# Patient Record
Sex: FEMALE | Race: WHITE | NOT HISPANIC OR LATINO | Employment: OTHER | URBAN - METROPOLITAN AREA
[De-identification: names, ages, dates, MRNs, and addresses within clinical notes are randomized per-mention and may not be internally consistent; named-entity substitution may affect disease eponyms.]

---

## 2023-10-24 DIAGNOSIS — E78.2 DM TYPE 2 WITH DIABETIC MIXED HYPERLIPIDEMIA (MULTI): ICD-10-CM

## 2023-10-24 DIAGNOSIS — E11.69 DM TYPE 2 WITH DIABETIC MIXED HYPERLIPIDEMIA (MULTI): Primary | ICD-10-CM

## 2023-10-24 DIAGNOSIS — E11.69 DM TYPE 2 WITH DIABETIC MIXED HYPERLIPIDEMIA (MULTI): ICD-10-CM

## 2023-10-24 DIAGNOSIS — E78.2 DM TYPE 2 WITH DIABETIC MIXED HYPERLIPIDEMIA (MULTI): Primary | ICD-10-CM

## 2023-10-24 PROBLEM — G62.9 PERIPHERAL NEUROPATHY: Status: ACTIVE | Noted: 2023-10-24

## 2023-10-24 PROBLEM — Z98.890 S/P LUMPECTOMY, LEFT BREAST: Status: ACTIVE | Noted: 2023-10-24

## 2023-10-24 PROBLEM — N20.0 NEPHROLITHIASIS: Status: ACTIVE | Noted: 2023-10-24

## 2023-10-24 PROBLEM — M65.342 TRIGGER RING FINGER OF LEFT HAND: Status: ACTIVE | Noted: 2023-10-24

## 2023-10-24 PROBLEM — Z97.8 FOLEY CATHETER STATUS: Status: ACTIVE | Noted: 2023-10-24

## 2023-10-24 PROBLEM — E83.52 HYPERCALCEMIA: Status: ACTIVE | Noted: 2023-10-24

## 2023-10-24 PROBLEM — E66.3 OVERWEIGHT: Status: ACTIVE | Noted: 2023-10-24

## 2023-10-24 PROBLEM — M79.18 MUSCULOSKELETAL PAIN: Status: ACTIVE | Noted: 2023-10-24

## 2023-10-24 PROBLEM — N13.30 HYDRONEPHROSIS: Status: ACTIVE | Noted: 2023-10-24

## 2023-10-24 PROBLEM — C50.912 INVASIVE DUCTAL CARCINOMA OF BREAST, LEFT (MULTI): Status: ACTIVE | Noted: 2023-10-24

## 2023-10-24 PROBLEM — E78.5 HLD (HYPERLIPIDEMIA): Status: ACTIVE | Noted: 2022-04-01

## 2023-10-24 PROBLEM — N20.1 LEFT URETERAL STONE: Status: ACTIVE | Noted: 2023-10-24

## 2023-10-24 PROBLEM — Z85.3 HISTORY OF BREAST CANCER: Status: ACTIVE | Noted: 2022-04-01

## 2023-10-24 PROBLEM — M65.331 TRIGGER MIDDLE FINGER OF RIGHT HAND: Status: ACTIVE | Noted: 2023-10-24

## 2023-10-24 RX ORDER — ATORVASTATIN CALCIUM 10 MG/1
10 TABLET, FILM COATED ORAL DAILY
Qty: 30 TABLET | Refills: 0 | Status: SHIPPED | OUTPATIENT
Start: 2023-10-24 | End: 2024-01-03

## 2023-10-24 RX ORDER — BLOOD SUGAR DIAGNOSTIC
STRIP MISCELLANEOUS
COMMUNITY
Start: 2020-07-09

## 2023-10-24 RX ORDER — CHOLECALCIFEROL (VITAMIN D3) 125 MCG
1 CAPSULE ORAL DAILY
COMMUNITY

## 2023-10-24 RX ORDER — ATORVASTATIN CALCIUM 10 MG/1
10 TABLET, FILM COATED ORAL DAILY
COMMUNITY
Start: 2023-09-19 | End: 2023-10-24 | Stop reason: SDUPTHER

## 2023-10-24 RX ORDER — METFORMIN HYDROCHLORIDE 1000 MG/1
1000 TABLET ORAL 2 TIMES DAILY
Qty: 60 TABLET | Refills: 0 | Status: SHIPPED | OUTPATIENT
Start: 2023-10-24 | End: 2024-01-03

## 2023-10-24 RX ORDER — HYDROCODONE BITARTRATE AND ACETAMINOPHEN 5; 325 MG/1; MG/1
1 TABLET ORAL EVERY 4 HOURS PRN
COMMUNITY

## 2023-10-24 RX ORDER — REPAGLINIDE 1 MG/1
TABLET ORAL
Qty: 60 TABLET | Refills: 0 | Status: SHIPPED | OUTPATIENT
Start: 2023-10-24 | End: 2024-01-03

## 2023-10-24 RX ORDER — GABAPENTIN 600 MG/1
600 TABLET ORAL 2 TIMES DAILY
COMMUNITY
Start: 2023-09-27 | End: 2024-01-22 | Stop reason: ALTCHOICE

## 2023-10-24 RX ORDER — AMITRIPTYLINE HYDROCHLORIDE 10 MG/1
1-2 TABLET, FILM COATED ORAL NIGHTLY PRN
COMMUNITY
Start: 2023-08-30 | End: 2023-11-30 | Stop reason: WASHOUT

## 2023-10-24 RX ORDER — BIOTIN 1 MG
1 TABLET ORAL DAILY
COMMUNITY

## 2023-10-24 RX ORDER — REPAGLINIDE 1 MG/1
1 TABLET ORAL
COMMUNITY
Start: 2023-10-17 | End: 2023-10-24 | Stop reason: SDUPTHER

## 2023-10-24 RX ORDER — LETROZOLE 2.5 MG/1
2.5 TABLET, FILM COATED ORAL DAILY
COMMUNITY
Start: 2022-11-01 | End: 2023-11-30 | Stop reason: WASHOUT

## 2023-10-24 RX ORDER — METFORMIN HYDROCHLORIDE 1000 MG/1
1000 TABLET ORAL 2 TIMES DAILY
COMMUNITY
Start: 2023-09-16 | End: 2023-10-24 | Stop reason: SDUPTHER

## 2023-10-24 RX ORDER — LANOLIN ALCOHOL/MO/W.PET/CERES
1 CREAM (GRAM) TOPICAL 2 TIMES DAILY
COMMUNITY

## 2023-10-25 ENCOUNTER — HOSPITAL ENCOUNTER (OUTPATIENT)
Dept: RADIOLOGY | Facility: HOSPITAL | Age: 73
Discharge: HOME | End: 2023-10-25
Payer: MEDICARE

## 2023-10-25 ENCOUNTER — OFFICE VISIT (OUTPATIENT)
Dept: PULMONOLOGY | Facility: HOSPITAL | Age: 73
End: 2023-10-25
Payer: MEDICARE

## 2023-10-25 VITALS
BODY MASS INDEX: 29.03 KG/M2 | SYSTOLIC BLOOD PRESSURE: 174 MMHG | OXYGEN SATURATION: 100 % | WEIGHT: 158.73 LBS | RESPIRATION RATE: 20 BRPM | TEMPERATURE: 97.3 F | HEART RATE: 63 BPM | DIASTOLIC BLOOD PRESSURE: 64 MMHG

## 2023-10-25 DIAGNOSIS — M31.30 WEGENER'S GRANULOMATOSIS WITHOUT RENAL INVOLVEMENT (MULTI): ICD-10-CM

## 2023-10-25 DIAGNOSIS — R91.8 LUNG NODULES: ICD-10-CM

## 2023-10-25 DIAGNOSIS — R59.0 MEDIASTINAL LYMPHADENOPATHY: Primary | ICD-10-CM

## 2023-10-25 PROCEDURE — 3077F SYST BP >= 140 MM HG: CPT | Performed by: INTERNAL MEDICINE

## 2023-10-25 PROCEDURE — 1126F AMNT PAIN NOTED NONE PRSNT: CPT | Performed by: INTERNAL MEDICINE

## 2023-10-25 PROCEDURE — 1036F TOBACCO NON-USER: CPT | Performed by: INTERNAL MEDICINE

## 2023-10-25 PROCEDURE — 99213 OFFICE O/P EST LOW 20 MIN: CPT | Performed by: INTERNAL MEDICINE

## 2023-10-25 PROCEDURE — 71250 CT THORAX DX C-: CPT

## 2023-10-25 PROCEDURE — 1160F RVW MEDS BY RX/DR IN RCRD: CPT | Performed by: INTERNAL MEDICINE

## 2023-10-25 PROCEDURE — 3078F DIAST BP <80 MM HG: CPT | Performed by: INTERNAL MEDICINE

## 2023-10-25 PROCEDURE — 1159F MED LIST DOCD IN RCRD: CPT | Performed by: INTERNAL MEDICINE

## 2023-10-25 PROCEDURE — 71250 CT THORAX DX C-: CPT | Performed by: RADIOLOGY

## 2023-10-25 RX ORDER — REPAGLINIDE 1 MG/1
TABLET ORAL
Qty: 180 TABLET | Refills: 2 | OUTPATIENT
Start: 2023-10-25

## 2023-10-25 ASSESSMENT — PAIN SCALES - GENERAL: PAINLEVEL: 0-NO PAIN

## 2023-10-25 NOTE — PROGRESS NOTES
Department of Medicine  Division of Pulmonary, Critical Care, and Sleep Medicine  Follow-Up Visit    ID:  Ms. Miley Botello is a 72 y.o. female former smoker with h/o breast cancer who presents today for follow-up of lung nodules.    HPI (10/25/2023): Ms. Ibarra is overall doing well and without any respiratory complaints.  She denies any recent fevers or chills.  She denies any shortness of breath, dyspnea on exertion, cough, wheezing, hemoptysis.  She denies any chest pain, lower extreme edema, orthopnea.  She denies any nausea, vomiting, odynophagia, dysphagia, heartburn, anorexia, or weight loss.  She denies any sinus congestion or postnasal drip.  She recently had the flu vaccine in the RSV vaccine.    Social History:    Tobacco: Former smoker - smoked 1/2 - 1 ppd from age [age]    Immunization History:  Immunization History   Administered Date(s) Administered    Flu vaccine, quadrivalent, high-dose, preservative free, age 65y+ (FLUZONE) 09/14/2020, 09/28/2021, 09/16/2022, 10/09/2023    Influenza, High Dose Seasonal, Preservative Free 11/29/2017, 10/22/2018    Pfizer COVID-19 vaccine, bivalent, age 12 years and older (30 mcg/0.3 mL) 09/19/2022    Pfizer Gray Cap SARS-CoV-2 05/15/2022    Pfizer Purple Cap SARS-CoV-2 02/12/2021, 03/05/2021, 10/11/2021    Pneumococcal conjugate vaccine, 13-valent (PREVNAR 13) 05/10/2017    Pneumococcal polysaccharide vaccine, 23-valent, age 2 years and older (PNEUMOVAX 23) 10/22/2018    RSV, 60 Years And Older (AREXVY) 10/11/2023    Tdap vaccine, age 7 year and older (BOOSTRIX) 04/08/2014       Current Medications:  Current Outpatient Medications   Medication Instructions    amitriptyline (Elavil) 10 mg tablet 1-2 tablets, oral, Nightly PRN    atorvastatin (LIPITOR) 10 mg, oral, Daily    biotin 1 mg tablet 1 tablet, oral, Daily    cholecalciferol (Vitamin D-3) 125 MCG (5000 UT) capsule 1 capsule, oral, Daily    cyanocobalamin (Vitamin B-12) 1,000 mcg tablet 1 tablet,  oral, 2 times daily    gabapentin (NEURONTIN) 600 mg, oral, 2 times daily    HYDROcodone-acetaminophen (Norco) 5-325 mg tablet 1 tablet, oral, Every 4 hours PRN    letrozole (FEMARA) 2.5 mg, oral, Daily    metFORMIN (GLUCOPHAGE) 1,000 mg, oral, 2 times daily    OneTouch Ultra Test strip CHECK BLOOD GLUCOSE ONCE A DAY    repaglinide (Prandin) 1 mg tablet TAKE 1 TABLET 2 TIMES DAILY, 15 MINUTES BEFORE MEALS (PLEASE MAKE APPOINTMENT)    VITAMIN B COMPLEX-FOLIC ACID ORAL 1 tablet, oral, Daily, ;vitamin B complex-folic acid 0.4 mg Tablet        Drug Allergies/Intolerances:  Allergies   Allergen Reactions    Bacitracin Other    Ciprofloxacin Myalgia    Miconazole Nitrate Other    Fluticasone Propionate Unknown    Nickel Rash    Sulfa (Sulfonamide Antibiotics) Rash        Physical Examination:  /64 (BP Location: Right arm, Patient Position: Sitting, BP Cuff Size: Adult)   Pulse 63   Temp 36.3 °C (97.3 °F) (Temporal)   Resp 20   Wt 72 kg (158 lb 11.7 oz)   SpO2 100%   BMI 29.03 kg/m²      CONSTITUTIONAL: alert and in no acute distress, well developed, well nourished, speaking in full sentences  HEAD & FACE: normal appearance. Palpation of the face and sinuses: normal  EARS, NOSE, MOUTH, & THROAT: External inspection of ears and nose: normal. Nasal mucosa, septum, and turbinates: normal. Lips, teeth, and gums: normal. Oropharynx: normal  NECK: no neck mass was observed, supple  PULMONARY: Chest: normal to inspection. Respiratory effort: no increased work of breathing or signs of respiratory distress. Percussion of chest: normal. Palpation of chest: normal. Auscultation of lungs: clear to auscultation bilaterally, no wheeze / rales / rhonchi  CARDIOVASCULAR: Palpation of heart: normal. Heart rate and rhythm were normal, normal S1 and S2, no gallops, no murmurs and no pericardial rub, no peripheral edema  ABDOMEN: Soft nontender; no abdominal mass palpated, normal bowel sounds, no organomegaly  LYMPHATIC: No  lymphadenopathy  MUSCULOSKELETAL: Gait and station: Normal, no clubbing or cyanosis of the fingernails. Muscle strength/tone: normal  NEUROLOGIC: cranial nerves II - XII were grossly intact. Sensation: normal. Motor Strength: normal. Coordination: normal  PSYCHIATRIC: Judgment and insight: intact, alert and oriented to person, place and time. Recent and remote memory: normal. Mood and affect: normal    Pulmonary Function Test Results           Chest Radiograph     XR CHEST 1 VIEW 03/29/2022    Narrative  MRN: [id number]  Patient Name: GURMEET ESCOBAR    STUDY:  CHEST 1 VIEW;  3/29/2022 9:42 am    INDICATION:  abd pain .    COMPARISON:  08/30/2017    ACCESSION NUMBER(S):  [id number]    ORDERING CLINICIAN:  MARIVEL GOMEZ    FINDINGS:  AP radiograph of the chest was provided.      CARDIOMEDIASTINAL SILHOUETTE:  Cardiomediastinal silhouette is normal in size and configuration.    LUNGS:  No focal consolidation, sizeable pleural effusion, or pneumothorax is  evident.    ABDOMEN:  No remarkable upper abdominal findings.    BONES:  No acute osseous changes.    Impression  1.  No evidence of acute cardiopulmonary process.      Echocardiogram     No results found for this or any previous visit from the past 365 days.       Chest CT Scan     CT chest 10/25/2023 -unchanged small bilateral peripheral lung nodules, unchanged mediastinal and hilar lymphadenopathy       Laboratory Studies    Metabolic Parameters     Sodium   Date/Time Value Ref Range Status   10/13/2022 11:05  136 - 145 mmol/L Final     Potassium   Date/Time Value Ref Range Status   10/13/2022 11:05 AM 4.9 3.5 - 5.3 mmol/L Final     Chloride   Date/Time Value Ref Range Status   10/13/2022 11:05  98 - 107 mmol/L Final     Bicarbonate   Date/Time Value Ref Range Status   10/13/2022 11:05 AM 26 21 - 32 mmol/L Final     Anion Gap   Date/Time Value Ref Range Status   10/13/2022 11:05 AM 16 10 - 20 mmol/L Final     Urea Nitrogen   Date/Time Value Ref Range Status    10/13/2022 11:05 AM 28 (H) 6 - 23 mg/dL Final     Creatinine   Date/Time Value Ref Range Status   10/13/2022 11:05 AM 0.76 0.50 - 1.05 mg/dL Final     GFR Female   Date/Time Value Ref Range Status   10/13/2022 11:05 AM 84 >90 mL/min/1.73m2 Final     Comment:      CALCULATIONS OF ESTIMATED GFR ARE PERFORMED   USING THE 2021 CKD-EPI STUDY REFIT EQUATION   WITHOUT THE RACE VARIABLE FOR THE IDMS-TRACEABLE   CREATININE METHODS.    https://jasn.asnjournals.org/content/early/2021/09/22/ASN.[email or web address]       Glucose   Date/Time Value Ref Range Status   10/13/2022 11:05  (H) 74 - 99 mg/dL Final     Calcium   Date/Time Value Ref Range Status   10/13/2022 11:05 AM 11.0 (H) 8.6 - 10.6 mg/dL Final     Phosphorus   Date/Time Value Ref Range Status   03/31/2022 12:03 PM 2.8 2.5 - 4.9 mg/dL Final     Comment:      The performance characteristics of phosphorus testing in   heparinized plasma have been validated by the individual     laboratory site where testing is performed. Testing    on heparinized plasma is not approved by the FDA;    however, such approval is not necessary.         Hematologic Parameters     WBC   Date/Time Value Ref Range Status   10/13/2022 11:05 AM 10.4 4.4 - 11.3 x10E9/L Final     Neutrophils Absolute   Date/Time Value Ref Range Status   10/13/2022 11:05 AM 7.14 1.20 - 7.70 x10E9/L Final     Neutrophils %   Date/Time Value Ref Range Status   10/13/2022 11:05 AM 69.0 40.0 - 80.0 % Final     Lymphocytes %   Date/Time Value Ref Range Status   10/13/2022 11:05 AM 22.7 13.0 - 44.0 % Final     Monocytes %   Date/Time Value Ref Range Status   10/13/2022 11:05 AM 5.3 2.0 - 10.0 % Final     Basophils %   Date/Time Value Ref Range Status   10/13/2022 11:05 AM 0.4 0.0 - 2.0 % Final     Eosinophils Absolute   Date/Time Value Ref Range Status   10/13/2022 11:05 AM 0.23 0.00 - 0.70 x10E9/L Final     Eosinophils %   Date/Time Value Ref Range Status   10/13/2022 11:05 AM 2.2 0.0 - 6.0 % Final     Hemoglobin  "  Date/Time Value Ref Range Status   10/13/2022 11:05 AM 12.0 12.0 - 16.0 g/dL Final     Hematocrit   Date/Time Value Ref Range Status   10/13/2022 11:05 AM 38.4 36.0 - 46.0 % Final     RBC   Date/Time Value Ref Range Status   10/13/2022 11:05 AM 4.21 4.00 - 5.20 x10E12/L Final     MCV   Date/Time Value Ref Range Status   10/13/2022 11:05 AM 91 80 - 100 fL Final     MCHC   Date/Time Value Ref Range Status   10/13/2022 11:05 AM 31.3 (L) 32.0 - 36.0 g/dL Final     Platelets   Date/Time Value Ref Range Status   10/13/2022 11:05  150 - 450 x10E9/L Final       Immunology Laboratory Tests     No results found for: \"ICIGE\", \"IGE\", \"ICA04\", \"ASPFU\", \"IGG\", \"IGA\", \"IGM\"    Assessment and Plan / Recommendations:  Problem List Items Addressed This Visit          Pulmonary and Pneumonias    Lung nodules    Current Assessment & Plan     Stable small bilateral lung nodules, likely sequelae of piror inflammatory process  Repeat CT chest in 1 year         Relevant Orders    CT chest wo IV contrast    Follow Up In Pulmonology       Symptoms and Signs    Mediastinal lymphadenopathy - Primary    Overview     Diffuse mediastinal and hilar FDG avid lymphadenopathy, biopsied October 2022 showing granulomatous inflammation in the station 7 LN         Current Assessment & Plan     Unchanged on repeat CT chest  Repeat CT chest in 1 year         Relevant Orders    CT chest wo IV contrast    Follow Up In Pulmonology            Follow-up: 1 year       Hieu Walker MD   10/25/2023   "

## 2023-10-25 NOTE — PATIENT INSTRUCTIONS
Lung nodules  Stable small bilateral lung nodules, likely sequelae of piror inflammatory process  Repeat CT chest in 1 year    Mediastinal lymphadenopathy  Unchanged on repeat CT chest  Repeat CT chest in 1 year

## 2023-10-25 NOTE — ASSESSMENT & PLAN NOTE
Stable small bilateral lung nodules, likely sequelae of piror inflammatory process  Repeat CT chest in 1 year

## 2023-11-24 NOTE — PROGRESS NOTES
"FUV for diabetes. LV with Dr. Singh 06/2023.    Subjective   Miley Botello is a 72 y.o. female with a hx of type 2 diabetes, HLD, breast cancer, who presents for management of diabetes.    Dx: 2017  HbA1c: 6.7% (11/30/2023), 6.8% (03/2022), 6.5% (09/2021)  Current regimen: metformin 1g BID and repaglinide 1 mg BID  Past medications: none  Complications: peripheral neuropathy    SMBG: does not check    Hypoglycemia: no  Hypoglycemia awareness: no    Diet: twice a day, low-carb  Eats breakfast and dinner    Exercise: goal [id number] steps/day    Today:  -ozempic was too expensive  -wants [place] but it is also cost prohibitive    ROS  General: no fever or chills  CV: no chest pain   Respiratory: no shortness of breath  MSK: no lower extremity edema  Neuro: no headache or dizziness  See HPI for Endocrine ROS    Objective    Physical Exam  Height 1.575 m (5' 2\"), weight 70.9 kg (156 lb 4.8 oz).  General: not in acute distress  HEENT: KENYATTA, EOMI  Thyroid: no goiter  Neuro: alert and oriented x 3      Current Outpatient Medications:     amitriptyline (Elavil) 10 mg tablet, Take 1-2 tablets (10-20 mg) by mouth as needed at bedtime., Disp: , Rfl:     atorvastatin (Lipitor) 10 mg tablet, TAKE 1 TABLET BY MOUTH EVERY DAY, Disp: 30 tablet, Rfl: 0    biotin 1 mg tablet, Take 1 tablet (1 mg) by mouth once daily., Disp: , Rfl:     cholecalciferol (Vitamin D-3) 125 MCG (5000 UT) capsule, Take 1 capsule (125 mcg) by mouth once daily., Disp: , Rfl:     cyanocobalamin (Vitamin B-12) 1,000 mcg tablet, Take 1 tablet (1,000 mcg) by mouth twice a day., Disp: , Rfl:     gabapentin (Neurontin) 600 mg tablet, Take 1 tablet (600 mg) by mouth 2 times a day., Disp: , Rfl:     metFORMIN (Glucophage) 1,000 mg tablet, TAKE 1 TABLET BY MOUTH TWICE A DAY, Disp: 60 tablet, Rfl: 0    OneTouch Ultra Test strip, CHECK BLOOD GLUCOSE ONCE A DAY, Disp: , Rfl:     repaglinide (Prandin) 1 mg tablet, TAKE 1 TABLET 2 TIMES DAILY, 15 MINUTES BEFORE MEALS " (PLEASE MAKE APPOINTMENT), Disp: 60 tablet, Rfl: 0    HYDROcodone-acetaminophen (Norco) 5-325 mg tablet, Take 1 tablet by mouth every 4 hours if needed for severe pain (7 - 10)., Disp: , Rfl:     VITAMIN B COMPLEX-FOLIC ACID ORAL, Take 1 tablet by mouth once daily. ;vitamin B complex-folic acid 0.4 mg Tablet, Disp: , Rfl:     Assessment/Plan   Miley Botello is a 72 y.o. female with a hx of type 2 diabetes, HLD, breast cancer, who presents for management of diabetes.    Type 2 diabetes mellitus  HbA1c: 6.7% (11/30/2023), 6.8% (03/2022), 6.5% (09/2021)  Current regimen: metformin 1g BID, repaglinide 1 mg BIDAC  Eye exam: +mild NPDR (10/2023)  Urine microalbumin: 50.8 micrograms/mg (2020)  Podiatry:  +neuropathy ( 11/2023)  Lipids: HDL 43, LDL 68,  (09/2021) on atorvastatin 10 mg QDAY    A1c: 6.7% today.  A1c has been stable.    Was told to start Ozempic if cleared by ophthalmologist at .  Recent ophthalmology note from 10/2023 states she has a posterior vitreous detachment.  The patient reports being told that everything was stable.  She did not start Ozempic because it was too expensive.  She is still interested in this medication. If her ophthalmologist is ok with starting Ozempic, I will have her apply for [employer] PAP.  Hx of pancreatitis: no  Personal or FH of medullary thyroid cancer: no  If she does not qualify for the PAP, we can try Trulicity or Mounjaro which may be covered by insurance.    Discussed low-carb diet and exercise with patient. She appears to be adherent to a low-carb diet.  She agrees that she needs to start incorporating resistance exercises to help with weight loss.    Peripheral neuropathy is terrible at night. She has difficulty sleeping.  Did not tolerate higher doses of gabapentin.  Recommended trial of duloxetine which she is agreeable to.    PLAN:  -if cleared by ophthalmology, start Ozempic 0.25 mg/week, increase to 0.5 mg/week after 4 weeks  -apply for [facility] PAP  (forms given-she will bring back to the office)  -continue metformin 1 g BID  -discontinue repaglinide when starting Ozempic  -continue gabapentin 600 mg at bedtime for neuropathy  -start duloxetine 30 mg QDAY  -check lipids, urine microalbumin, CMP    2. Hypercalcemia  Intermittently elevated calcium at least since 03/2022.  Highest calcium 11.1.    Labs from 05/2022  Ca 10.2  PTH 34.8  D25OH 49  Creatinine 0.75  PTHrP: normal    DXA: NA    Hx of kidney stones: once, approx 2020    Did not have time to discuss hypercalcemia in detail today.  PTH has never been checked when calcium is elevated.  Will need to determine PTH dependence first.  Will check labs below but will address again at next visit.    PLAN:  -check D25OH, PTH with labs above  -continue vitamin D supplements    Follow-up in 3-4 months

## 2023-11-30 ENCOUNTER — OFFICE VISIT (OUTPATIENT)
Dept: ENDOCRINOLOGY | Facility: CLINIC | Age: 73
End: 2023-11-30
Payer: MEDICARE

## 2023-11-30 VITALS — WEIGHT: 156.3 LBS | HEIGHT: 62 IN | BODY MASS INDEX: 28.76 KG/M2

## 2023-11-30 DIAGNOSIS — E11.69 DM TYPE 2 WITH DIABETIC MIXED HYPERLIPIDEMIA (MULTI): ICD-10-CM

## 2023-11-30 DIAGNOSIS — G62.89 OTHER POLYNEUROPATHY: Primary | ICD-10-CM

## 2023-11-30 DIAGNOSIS — E78.2 DM TYPE 2 WITH DIABETIC MIXED HYPERLIPIDEMIA (MULTI): ICD-10-CM

## 2023-11-30 DIAGNOSIS — E83.52 HYPERCALCEMIA: ICD-10-CM

## 2023-11-30 LAB — POC HEMOGLOBIN A1C: 6.7 % (ref 4.2–6.5)

## 2023-11-30 PROCEDURE — 99215 OFFICE O/P EST HI 40 MIN: CPT | Performed by: STUDENT IN AN ORGANIZED HEALTH CARE EDUCATION/TRAINING PROGRAM

## 2023-11-30 PROCEDURE — G2212 PROLONG OUTPT/OFFICE VIS: HCPCS | Performed by: STUDENT IN AN ORGANIZED HEALTH CARE EDUCATION/TRAINING PROGRAM

## 2023-11-30 PROCEDURE — 1036F TOBACCO NON-USER: CPT | Performed by: STUDENT IN AN ORGANIZED HEALTH CARE EDUCATION/TRAINING PROGRAM

## 2023-11-30 PROCEDURE — 83036 HEMOGLOBIN GLYCOSYLATED A1C: CPT | Performed by: STUDENT IN AN ORGANIZED HEALTH CARE EDUCATION/TRAINING PROGRAM

## 2023-11-30 PROCEDURE — 1160F RVW MEDS BY RX/DR IN RCRD: CPT | Performed by: STUDENT IN AN ORGANIZED HEALTH CARE EDUCATION/TRAINING PROGRAM

## 2023-11-30 PROCEDURE — 1126F AMNT PAIN NOTED NONE PRSNT: CPT | Performed by: STUDENT IN AN ORGANIZED HEALTH CARE EDUCATION/TRAINING PROGRAM

## 2023-11-30 PROCEDURE — 1159F MED LIST DOCD IN RCRD: CPT | Performed by: STUDENT IN AN ORGANIZED HEALTH CARE EDUCATION/TRAINING PROGRAM

## 2023-11-30 RX ORDER — DULOXETIN HYDROCHLORIDE 30 MG/1
30 CAPSULE, DELAYED RELEASE ORAL DAILY
Qty: 30 CAPSULE | Refills: 11 | Status: SHIPPED | OUTPATIENT
Start: 2023-11-30 | End: 2024-11-29

## 2023-12-07 ENCOUNTER — TELEPHONE (OUTPATIENT)
Dept: HEMATOLOGY/ONCOLOGY | Facility: HOSPITAL | Age: 73
End: 2023-12-07

## 2023-12-15 ENCOUNTER — OFFICE VISIT (OUTPATIENT)
Dept: HEMATOLOGY/ONCOLOGY | Facility: CLINIC | Age: 73
End: 2023-12-15
Payer: MEDICARE

## 2023-12-15 DIAGNOSIS — Z85.3 HISTORY OF LEFT BREAST CANCER: Primary | ICD-10-CM

## 2023-12-15 PROCEDURE — 99213 OFFICE O/P EST LOW 20 MIN: CPT | Performed by: NURSE PRACTITIONER

## 2023-12-15 PROCEDURE — 1126F AMNT PAIN NOTED NONE PRSNT: CPT | Performed by: NURSE PRACTITIONER

## 2023-12-15 PROCEDURE — 1036F TOBACCO NON-USER: CPT | Performed by: NURSE PRACTITIONER

## 2023-12-15 PROCEDURE — 1160F RVW MEDS BY RX/DR IN RCRD: CPT | Performed by: NURSE PRACTITIONER

## 2023-12-15 PROCEDURE — 1159F MED LIST DOCD IN RCRD: CPT | Performed by: NURSE PRACTITIONER

## 2023-12-15 NOTE — PROGRESS NOTES
Oncology Follow-Up    Miley Botello  [id number]            Breast         AJCC Edition: 7th (AJCC), Diagnosis Date: Aug 2017, IA, T1c pN0 M0   Oncology History    No history exists.     Treatment Synopsis:    Current treatment- observation        Treatment History:    Pt states that she had routine mammogram at time of cancer diagnosis but did not have any palpable findings.  Initial w/u including mammogram, biopsy done at [facility] (reports obtained).  Prior left breast core biopsy in 9/1/06- fat necrosis (no cancer).       6/2/17- Screening b/l mammogram- indeterminate left breast asymmetry noted and additional imaging recc.      6/15/17- Left diagnostic mammogram/U/S- Mammo showed a focal asymmetry in left breast 12:00.  1 X 0.9 X 0.4 cm lobulated hypoechoic left breast mass at 12:00, 2 cm FN.  No abnormalities seen in left axilla.  Breast mass suspicious and biopsy recc     7/6/17- U/s guided biopsy of left breast mass 12:00, 2 cm FN- IDC, grade 3, DCIS, grade 3.  Some features of invasive micropapillary cancer present.  Receptors- ER strongly positive >95%, WV moderately positive 80%, HER 2+ by IHC (equivocal), HER2/CEP17  ratio 1.9 (equivocal)     8/15/17- Left lumpectomy and SN biopsy- IDC, 1.4 cm, grade 3, indeterminate LVI, 0/2 LNs involved.  Margins (deep new margin)- cauterized atypical intraductal proliferation suspicious for DCIS, all other margins negative.  Path staging: pT1cN0.  Receptors-  ER strongly positive >95%, WV strongly positive 85%, HER 1+ by IHC (negative)     Oncotype DX RS Score 16 (low risk category; 10% 10 yr distant recurrence risk with endocrine therapy)     9/27/17 - 10/24/17-  left breast radiation completed      11/2017- adjuvant endocrine therapy with letrozole.     1/5/22  - Breast Cancer Index testing showed a 9.5 risk of recurrent distant disease and a 5.2% risk of recurrent distant disease. There is no benefit with extended therapy. Plan to discontinue letrozole in  November 2022.      Subjective    Miley presents for her Routine follow up visit. She is feeling well and reports no new health issues. She is planning on spending 2 months in [place] this winter as she did last winter and really enjoyed her time there. Miley denies any unusual headaches, balance issues, depression, cough, shortness of breath, problems swallowing, changes in chest/breast area, abdominal pain, bone or muscle pain, vaginal bleeding, rectal bleeding, blood in the urine, vaginal dryness, swelling arms or legs, new or unusual skin moles or lesions.         Objective      There were no vitals filed for this visit.     Constitutional: Well developed, alert/oriented x3, no distress, cooperative   Eyes: clear sclera   ENMT: mucous membranes moist, no apparent lesions   Head/Neck: Neck supple, no bruits   Respiratory/Thorax: Patent airways, normal breath sounds with good chest expansion   Cardiovascular: Regular rate and rhythm, no murmurs, 2+ equal pulses of the extremities,   Gastrointestinal: Nondistended, soft, non-tender, no masses palpable, no organomegaly   Musculoskeletal: ROM intact, no joint swelling, normal strength   Extremities: normal extremities, no edema, cyanosis, contusions or wounds   Neurological: alert and oriented x3,  normal strength   Breast:     Lymphatic: No significant lymphadenopathy   Psychological: Appropriate mood and behavior   Skin: Warm and dry, no lesions, no rashes      Physical Exam  Chest:          Comments: Left breast + for breast conserving surgery with well healed cental superior and left axillary incisions; no masses, nodules, skin changes, discharge. Right breast without masses, nodules, skin changes, discharge          Lab Results   Component Value Date    WBC 10.4 10/13/2022    HGB 12.0 10/13/2022    HCT 38.4 10/13/2022    MCV 91 10/13/2022     10/13/2022       Chemistry    Lab Results   Component Value Date/Time     10/13/2022 1105    K 4.9 10/13/2022  1105     10/13/2022 1105    CO2 26 10/13/2022 1105    BUN 28 (H) 10/13/2022 1105    CREATININE 0.76 10/13/2022 1105    Lab Results   Component Value Date/Time    CALCIUM 11.0 (H) 10/13/2022 1105    ALKPHOS 51 05/06/2022 1330    AST 10 05/06/2022 1330    ALT 13 05/06/2022 1330    BILITOT 0.4 05/06/2022 1330              Imaging:  Narrative & Impression   Interpreted By:  NITISH WELCH MD  MRN: [id number]  Patient Name: GURMEET ESCOBAR     STUDY:  DIGITAL MAMM SCREENING W/ KISHOR;  6/21/2023 9:55 am     ACCESSION NUMBER(S):  [id number]     ORDERING CLINICIAN:  JULIANA MIXON     INDICATION:  Screening. History of a left lumpectomy with radiation therapy.  History of a bilateral breast reduction.     COMPARISON:  06/20/2022, 06/18/2021, 06/15/2020, 06/02/2017     FINDINGS:  2D and tomosynthesis images were reviewed at 1 mm slice thickness.     The breast tissue is almost entirely fatty.  In the lateral superior  left breast at middle depth there are dystrophic calcifications,  surgical clips, and scarring at the lumpectomy site. The lumpectomy  site is stable. Bilateral postreduction changes are noted. No  suspicious masses or calcifications are identified.     IMPRESSION:  No mammographic evidence of malignancy.     BI-RADS CATEGORY:     Category: 2 - Benign.  Recommendation: 1 Year Screening.     Assessment/Plan    Gurmeet is a 72 yo woman with a hx of T1CN0 left breast cancer diagnose din August 2017. She is s/p partial mastectomy, XRT, and completed 5 years of letrozole in November 2022. There is no evidence of recurrent disease on today's exam.   Plan:  Exam is negative.  Discussed weight loss strategies.  Encouraged monthly breast self exams, plant based diet, keep alcohol <3 drinks/week, exercise at least 2.5 hours/week.  We reviewed signs/symptoms of recurrence including new masses, new pigmented lesion, tugging or pulling of the skin, nipple discharge, rash in or around the chest area, or any new finding that  doesn't resolve within a 2-3 weeks.  All of Miley's questions/concerns were addressed.  Over 20 minutes of time was spent with this patient with >50% of the time with education, counseling, and coordination of care.   Hope will see Ya Henriquez NP in June with her mammogram. I will see her back in one year. She will call with any concerns.  Diagnoses and all orders for this visit:  History of left breast cancer          Linette Vo, MELO-CNP

## 2023-12-15 NOTE — PATIENT INSTRUCTIONS
1. Exercise 2.5 hours per week; bone strengthening, cardio-vascular, resistance training.  2. Please do self breast exams monthly.  3. Keep alcohol under 3 drinks per week.  4. Sun safety - limit sun exposure from 11a-2p when its at its hottest, apply 15-30 sun block and re-apply every 1-2 hours if perspiring or swimming.  5. Eat a plant based diet, add in oily fishes such as mackerel, tuna, and salmon.  6. Get in at least 1,000 mg of calcium per day through diet or supplement for bone strength. Examples of foods higher in calcium are milk, yogurt, fruited yogurt, oranges, fortified orange juice, almonds, almond milk, broccoli, spinach, bok jeri, mustard greens, puddings, custards, ice cream, fortified cereals, bars, and crackers.   7. Your exam is negative!  8. Please call the office if any new mass or rash in or around breast, or any uncontrolled symptoms that last over 2-3 weeks at [phone].  9. See Ya Henriquez NP in June with your mammogram.   10. It was nice seeing you today, Hope. I will see you back in one year. Have a wonderful time in [place]!  Thank you for choosing [facility] for your care.  Have a Happy, Healthy, Holiday Season!

## 2024-01-02 DIAGNOSIS — E78.2 DM TYPE 2 WITH DIABETIC MIXED HYPERLIPIDEMIA (MULTI): ICD-10-CM

## 2024-01-02 DIAGNOSIS — E11.69 DM TYPE 2 WITH DIABETIC MIXED HYPERLIPIDEMIA (MULTI): ICD-10-CM

## 2024-01-03 RX ORDER — METFORMIN HYDROCHLORIDE 1000 MG/1
1000 TABLET ORAL 2 TIMES DAILY
Qty: 60 TABLET | Refills: 0 | Status: SHIPPED | OUTPATIENT
Start: 2024-01-03 | End: 2024-01-08 | Stop reason: SDUPTHER

## 2024-01-03 RX ORDER — ATORVASTATIN CALCIUM 10 MG/1
10 TABLET, FILM COATED ORAL DAILY
Qty: 30 TABLET | Refills: 0 | Status: SHIPPED | OUTPATIENT
Start: 2024-01-03 | End: 2024-01-08 | Stop reason: SDUPTHER

## 2024-01-03 RX ORDER — REPAGLINIDE 1 MG/1
TABLET ORAL
Qty: 60 TABLET | Refills: 3 | Status: SHIPPED | OUTPATIENT
Start: 2024-01-03 | End: 2024-01-08 | Stop reason: SDUPTHER

## 2024-01-08 DIAGNOSIS — E11.69 DM TYPE 2 WITH DIABETIC MIXED HYPERLIPIDEMIA (MULTI): ICD-10-CM

## 2024-01-08 DIAGNOSIS — E78.2 DM TYPE 2 WITH DIABETIC MIXED HYPERLIPIDEMIA (MULTI): ICD-10-CM

## 2024-01-08 RX ORDER — METFORMIN HYDROCHLORIDE 1000 MG/1
1000 TABLET ORAL 2 TIMES DAILY
Qty: 180 TABLET | Refills: 1 | Status: SHIPPED | OUTPATIENT
Start: 2024-01-08

## 2024-01-08 RX ORDER — REPAGLINIDE 1 MG/1
TABLET ORAL
Qty: 180 TABLET | Refills: 1 | Status: SHIPPED | OUTPATIENT
Start: 2024-01-08

## 2024-01-08 RX ORDER — ATORVASTATIN CALCIUM 10 MG/1
10 TABLET, FILM COATED ORAL DAILY
Qty: 90 TABLET | Refills: 0 | Status: SHIPPED | OUTPATIENT
Start: 2024-01-08 | End: 2024-04-11

## 2024-01-16 ENCOUNTER — TELEPHONE (OUTPATIENT)
Dept: ENDOCRINOLOGY | Facility: CLINIC | Age: 74
End: 2024-01-16
Payer: MEDICARE

## 2024-01-16 NOTE — TELEPHONE ENCOUNTER
Pa request forwarded from previous endo office. Attempted to complete. PA was closed stating the following:

## 2024-01-19 ENCOUNTER — APPOINTMENT (OUTPATIENT)
Dept: ENDOCRINOLOGY | Facility: CLINIC | Age: 74
End: 2024-01-19
Payer: MEDICARE

## 2024-01-22 DIAGNOSIS — G63 POLYNEUROPATHY ASSOCIATED WITH UNDERLYING DISEASE (MULTI): Primary | ICD-10-CM

## 2024-01-22 RX ORDER — GABAPENTIN 300 MG/1
CAPSULE ORAL
Qty: 180 CAPSULE | Refills: 1 | Status: SHIPPED | OUTPATIENT
Start: 2024-01-22

## 2024-04-04 NOTE — PROGRESS NOTES
"FUV for diabetes. LV with Dr. Singh 06/2023.    Subjective   Miley Botello is a 73 y.o. female with a hx of type 2 diabetes, HLD, breast cancer, who presents for management of diabetes.    Dx: 2017  HbA1c: 6.8% (4/11/2024), 6.7% (11/30/2023), 6.8% (03/2022), 6.5% (09/2021)  Current regimen: metformin 1g BID and repaglinide 1 mg BID  Past medications: none  Complications: peripheral neuropathy    SMBG: does not check    Hypoglycemia: no  Hypoglycemia awareness: no    Diet: twice a day, low-carb  Eats breakfast and dinner    Exercise: 10,000 steps/day    Today:  -spent 2 months in [place]; had a wonderful time    ROS  General: no fever or chills  CV: no chest pain   Respiratory: no shortness of breath  MSK: no lower extremity edema  Neuro: no headache or dizziness  See HPI for Endocrine ROS    Objective    Physical Exam  Blood pressure 134/74, temperature 36.7 °C (98 °F), temperature source Tympanic, resp. rate 20, height 1.575 m (5' 2\"), weight 72 kg (158 lb 11.2 oz).  General: not in acute distress  HEENT: ADRIAN YOU  Thyroid: no goiter  Neuro: alert and oriented x 3      Current Outpatient Medications:     atorvastatin (Lipitor) 10 mg tablet, TAKE 1 TABLET BY MOUTH EVERY DAY, Disp: 90 tablet, Rfl: 3    biotin 1 mg tablet, Take 1 tablet (1 mg) by mouth once daily., Disp: , Rfl:     cholecalciferol (Vitamin D-3) 125 MCG (5000 UT) capsule, Take 1 capsule (125 mcg) by mouth once daily., Disp: , Rfl:     cyanocobalamin (Vitamin B-12) 1,000 mcg tablet, Take 1 tablet (1,000 mcg) by mouth twice a day., Disp: , Rfl:     gabapentin (Neurontin) 300 mg capsule, Take 1 capsule at bedtime x 2 weeks. Then take 2 capsules at bedtime., Disp: 180 capsule, Rfl: 1    HYDROcodone-acetaminophen (Norco) 5-325 mg tablet, Take 1 tablet by mouth every 4 hours if needed for severe pain (7 - 10)., Disp: , Rfl:     metFORMIN (Glucophage) 1,000 mg tablet, Take 1 tablet (1,000 mg) by mouth 2 times a day., Disp: 180 tablet, Rfl: 1    OneTouch " Ultra Test strip, CHECK BLOOD GLUCOSE ONCE A DAY, Disp: , Rfl:     repaglinide (Prandin) 1 mg tablet, TAKE 1 TABLET 2 TIMES DAILY, 15 MINUTES BEFORE MEALS, Disp: 180 tablet, Rfl: 1    VITAMIN B COMPLEX-FOLIC ACID ORAL, Take 1 tablet by mouth once daily. ;vitamin B complex-folic acid 0.4 mg Tablet, Disp: , Rfl:     DULoxetine (Cymbalta) 30 mg DR capsule, Take 1 capsule (30 mg) by mouth once daily. Do not crush or chew., Disp: 30 capsule, Rfl: 11    Assessment/Plan   Miley Botello is a 73 y.o. female with a hx of type 2 diabetes, HLD, breast cancer, who presents for management of diabetes.    Type 2 diabetes mellitus  HbA1c: 6.8% (4/11/2024), 6.7% (11/30/2023), 6.8% (03/2022), 6.5% (09/2021)  Current regimen: metformin 1g BID, repaglinide 1 mg BIDAC  Eye exam: +mild NPDR (10/2023)  Urine microalbumin: 50.8 micrograms/mg (2020)  Podiatry:  +neuropathy (LV 11/2023)  Lipids: HDL 43, LDL 68,  (09/2021) on atorvastatin 10 mg QDAY    A1c: 6.8% today.    Has not been approved for Ozempic yet.  Re-faxed application with requested information on 1/4, but never received approval or further communication from [employer].  Will apply again. Patient completed form after our visit today. Faxed in application along with all of of her insurance cards.    Tried duloxetine for neuropathy but did not tolerate (had tremors and shakiness); resumed gabapentin.  She would like to not have to take gabapentin long-term if possible.  Discussed trial of alpha lipoic acid and Qutenza.  She would like to try Qutenza if affordable.    PLAN:  -Ozempic 0.25 mg/week, increase to 0.5 mg/week after 4 weeks  -continue metformin 1 g BID  -discontinue repaglinide when starting Ozempic  -continue gabapentin 600 mg at bedtime for neuropathy  -prescribed Qutenza to  specialty pharmacy  -check lipids, urine microalbumin, CMP    2. Hypercalcemia  Intermittently elevated calcium at least since 03/2022.  Highest calcium 11.1.    Labs from 05/2022  Ca  10.2  PTH 34.8  D25OH 49  Creatinine 0.75  PTHrP: normal    DXA: NA    Hx of kidney stones: once, approx 2020    Did not have time to discuss hypercalcemia in detail today.  PTH has never been checked when calcium is elevated.  Will need to determine PTH dependence first.  Will check labs below but will address again at next visit.    PLAN:  -check D25OH, PTH with labs above  -continue vitamin D supplements    Follow-up in 4-5 months  Uses [employer].

## 2024-04-10 DIAGNOSIS — E78.2 DM TYPE 2 WITH DIABETIC MIXED HYPERLIPIDEMIA (MULTI): ICD-10-CM

## 2024-04-10 DIAGNOSIS — E11.69 DM TYPE 2 WITH DIABETIC MIXED HYPERLIPIDEMIA (MULTI): ICD-10-CM

## 2024-04-11 ENCOUNTER — OFFICE VISIT (OUTPATIENT)
Dept: ENDOCRINOLOGY | Facility: CLINIC | Age: 74
End: 2024-04-11
Payer: MEDICARE

## 2024-04-11 VITALS
BODY MASS INDEX: 29.21 KG/M2 | HEIGHT: 62 IN | TEMPERATURE: 98 F | RESPIRATION RATE: 20 BRPM | SYSTOLIC BLOOD PRESSURE: 134 MMHG | DIASTOLIC BLOOD PRESSURE: 74 MMHG | WEIGHT: 158.7 LBS

## 2024-04-11 DIAGNOSIS — E78.2 DM TYPE 2 WITH DIABETIC MIXED HYPERLIPIDEMIA (MULTI): Primary | ICD-10-CM

## 2024-04-11 DIAGNOSIS — E11.69 DM TYPE 2 WITH DIABETIC MIXED HYPERLIPIDEMIA (MULTI): Primary | ICD-10-CM

## 2024-04-11 DIAGNOSIS — E83.52 HYPERCALCEMIA: ICD-10-CM

## 2024-04-11 DIAGNOSIS — E11.40 TYPE 2 DIABETES MELLITUS WITH CHRONIC PAINFUL DIABETIC NEUROPATHY (MULTI): ICD-10-CM

## 2024-04-11 PROCEDURE — 1159F MED LIST DOCD IN RCRD: CPT | Performed by: STUDENT IN AN ORGANIZED HEALTH CARE EDUCATION/TRAINING PROGRAM

## 2024-04-11 PROCEDURE — 1036F TOBACCO NON-USER: CPT | Performed by: STUDENT IN AN ORGANIZED HEALTH CARE EDUCATION/TRAINING PROGRAM

## 2024-04-11 PROCEDURE — 3075F SYST BP GE 130 - 139MM HG: CPT | Performed by: STUDENT IN AN ORGANIZED HEALTH CARE EDUCATION/TRAINING PROGRAM

## 2024-04-11 PROCEDURE — 3078F DIAST BP <80 MM HG: CPT | Performed by: STUDENT IN AN ORGANIZED HEALTH CARE EDUCATION/TRAINING PROGRAM

## 2024-04-11 PROCEDURE — 99215 OFFICE O/P EST HI 40 MIN: CPT | Performed by: STUDENT IN AN ORGANIZED HEALTH CARE EDUCATION/TRAINING PROGRAM

## 2024-04-11 RX ORDER — ATORVASTATIN CALCIUM 10 MG/1
10 TABLET, FILM COATED ORAL DAILY
Qty: 90 TABLET | Refills: 3 | Status: SHIPPED | OUTPATIENT
Start: 2024-04-11

## 2024-04-11 RX ORDER — CAPSAICIN 8 %
4 KIT TOPICAL ONCE
Qty: 4 PATCH | Refills: 0 | Status: SHIPPED | OUTPATIENT
Start: 2024-04-11 | End: 2024-04-11

## 2024-04-12 ENCOUNTER — SPECIALTY PHARMACY (OUTPATIENT)
Dept: PHARMACY | Facility: CLINIC | Age: 74
End: 2024-04-12

## 2024-05-09 ENCOUNTER — CLINICAL SUPPORT (OUTPATIENT)
Dept: ENDOCRINOLOGY | Facility: CLINIC | Age: 74
End: 2024-05-09
Payer: MEDICARE

## 2024-05-09 DIAGNOSIS — E11.40 TYPE 2 DIABETES MELLITUS WITH CHRONIC PAINFUL DIABETIC NEUROPATHY (MULTI): Primary | ICD-10-CM

## 2024-05-09 NOTE — PROGRESS NOTES
Reason for Visit:  Miley Botello is a 73 y.o. female who presents for Initial DSME Visit    DSME - Global Assessment    Referring Provider: Dr. Nguyen    Type of Diabetes: Type 2    Patient Active Problem List    Diagnosis Date Noted    Mediastinal lymphadenopathy 10/25/2023    Lung nodules 10/25/2023    Trigger middle finger of right hand 10/24/2023    Trigger ring finger of left hand 10/24/2023    S/P lumpectomy, left breast 10/24/2023    Peripheral neuropathy 10/24/2023    Overweight 10/24/2023    Nephrolithiasis 10/24/2023    Musculoskeletal pain 10/24/2023    Left ureteral stone 10/24/2023    Invasive ductal carcinoma of breast, left (Multi) 10/24/2023    Hypercalcemia 10/24/2023    Hydronephrosis 10/24/2023    Ortega catheter status 10/24/2023    DM type 2 with diabetic mixed hyperlipidemia (Multi) 10/24/2023    HLD (hyperlipidemia) 04/01/2022    History of breast cancer 04/01/2022         Lab Results   Component Value Date    HGBA1C 6.7 (A) 11/30/2023    HGBA1C 6.8 (A) 03/22/2022    HGBA1C 6.5 (A) 09/27/2021    HGBA1C 6.7 03/16/2021    HGBA1C 6.7 07/14/2020    HGBA1C 8.3 04/27/2020         Current Outpatient Medications   Medication Sig Dispense Refill    atorvastatin (Lipitor) 10 mg tablet TAKE 1 TABLET BY MOUTH EVERY DAY 90 tablet 3    biotin 1 mg tablet Take 1 tablet (1 mg) by mouth once daily.      cholecalciferol (Vitamin D-3) 125 MCG (5000 UT) capsule Take 1 capsule (125 mcg) by mouth once daily.      cyanocobalamin (Vitamin B-12) 1,000 mcg tablet Take 1 tablet (1,000 mcg) by mouth twice a day.      DULoxetine (Cymbalta) 30 mg DR capsule Take 1 capsule (30 mg) by mouth once daily. Do not crush or chew. 30 capsule 11    gabapentin (Neurontin) 300 mg capsule Take 1 capsule at bedtime x 2 weeks. Then take 2 capsules at bedtime. 180 capsule 1    HYDROcodone-acetaminophen (Norco) 5-325 mg tablet Take 1 tablet by mouth every 4 hours if needed for severe pain (7 - 10).      metFORMIN (Glucophage) 1,000 mg  tablet Take 1 tablet (1,000 mg) by mouth 2 times a day. 180 tablet 1    OneTouch Ultra Test strip CHECK BLOOD GLUCOSE ONCE A DAY      repaglinide (Prandin) 1 mg tablet TAKE 1 TABLET 2 TIMES DAILY, 15 MINUTES BEFORE MEALS 180 tablet 1    VITAMIN B COMPLEX-FOLIC ACID ORAL Take 1 tablet by mouth once daily. ;vitamin B complex-folic acid 0.4 mg Tablet       No current facility-administered medications for this visit.       Topics Covered and Impression:    DSME Topics Covered During Visit:   Understanding Diabetes Basics  Learning to [place] with Stress, Depression and Other Concerns  Reducing Your Risk For Other Wisam Concerns  Reviewed Chronic Complications/Risks Related to Diabetes  Being Physically Active  Glycemic Pattern Management  MyPlate Method    Time: I personally spent a total of 60 minutes with the patient providing diabetes self-management education. Visit documentation will be sent electronically to referring provider.     Pantera Casillas, MARYCHUYN, RN, [facility]

## 2024-05-30 ENCOUNTER — PATIENT MESSAGE (OUTPATIENT)
Dept: ENDOCRINOLOGY | Facility: CLINIC | Age: 74
End: 2024-05-30
Payer: MEDICARE

## 2024-06-18 ENCOUNTER — TELEPHONE (OUTPATIENT)
Dept: ENDOCRINOLOGY | Facility: CLINIC | Age: 74
End: 2024-06-18
Payer: MEDICARE

## 2024-06-18 NOTE — TELEPHONE ENCOUNTER
Pt called and left a VM yesterday evening in regards to issues with dosing her Ozempic pen. Pt was able to prime the needle, but unable to twist any further to select any dose. Pt did however inserted the needle and attempted to push the plunger, but did not administer any dose. Pt typically gives Ozempic Sunday mornings and would like to resume that routine. Spoke to Dr. Nelida Palomares, PharmD to confirm its okay to give her 0.5 mg Ozempic injection today and resume next dose Wilfred morning. Pt was provided these instructions and has no further questions.    Pantera Casillas, BSN, RN, [facility]

## 2024-06-24 ENCOUNTER — HOSPITAL ENCOUNTER (OUTPATIENT)
Dept: RADIOLOGY | Facility: HOSPITAL | Age: 74
Discharge: HOME | End: 2024-06-24
Payer: MEDICARE

## 2024-06-24 VITALS — BODY MASS INDEX: 29.21 KG/M2 | WEIGHT: 158.73 LBS | HEIGHT: 62 IN

## 2024-06-24 DIAGNOSIS — Z00.00 ENCOUNTER FOR GENERAL ADULT MEDICAL EXAMINATION WITHOUT ABNORMAL FINDINGS: ICD-10-CM

## 2024-06-24 PROCEDURE — 77063 BREAST TOMOSYNTHESIS BI: CPT | Mod: BILATERAL PROCEDURE | Performed by: RADIOLOGY

## 2024-06-24 PROCEDURE — 77067 SCR MAMMO BI INCL CAD: CPT

## 2024-06-24 PROCEDURE — 77067 SCR MAMMO BI INCL CAD: CPT | Mod: BILATERAL PROCEDURE | Performed by: RADIOLOGY

## 2024-06-28 ENCOUNTER — APPOINTMENT (OUTPATIENT)
Dept: ENDOCRINOLOGY | Facility: CLINIC | Age: 74
End: 2024-06-28
Payer: MEDICARE

## 2024-06-28 DIAGNOSIS — E78.2 DM TYPE 2 WITH DIABETIC MIXED HYPERLIPIDEMIA (MULTI): Primary | ICD-10-CM

## 2024-06-28 DIAGNOSIS — E11.69 DM TYPE 2 WITH DIABETIC MIXED HYPERLIPIDEMIA (MULTI): Primary | ICD-10-CM

## 2024-06-28 PROCEDURE — G0108 DIAB MANAGE TRN  PER INDIV: HCPCS | Performed by: STUDENT IN AN ORGANIZED HEALTH CARE EDUCATION/TRAINING PROGRAM

## 2024-06-28 NOTE — Clinical Note
Pt wearing CGM Whitney pro since pt does not want to perform fingersticks. Pt returns to clinic on 7/12 to download sensor data. [No Acute Distress] : no acute distress [Well Nourished] : well nourished [Well Developed] : well developed [Well-Appearing] : well-appearing [Normal Sclera/Conjunctiva] : normal sclera/conjunctiva [PERRL] : pupils equal round and reactive to light [Normal Outer Ear/Nose] : the outer ears and nose were normal in appearance [Normal TMs] : both tympanic membranes were normal [No Lymphadenopathy] : no lymphadenopathy [Supple] : supple [Thyroid Normal, No Nodules] : the thyroid was normal and there were no nodules present [No Respiratory Distress] : no respiratory distress  [No Accessory Muscle Use] : no accessory muscle use [Clear to Auscultation] : lungs were clear to auscultation bilaterally [Normal Rate] : normal rate  [Regular Rhythm] : with a regular rhythm [Normal S1, S2] : normal S1 and S2 [No Murmur] : no murmur heard [No Edema] : there was no peripheral edema [Soft] : abdomen soft [Non Tender] : non-tender [Non-distended] : non-distended [No Masses] : no abdominal mass palpated [Normal Bowel Sounds] : normal bowel sounds [Normal Supraclavicular Nodes] : no supraclavicular lymphadenopathy [Normal Axillary Nodes] : no axillary lymphadenopathy [Normal Posterior Cervical Nodes] : no posterior cervical lymphadenopathy [Normal Anterior Cervical Nodes] : no anterior cervical lymphadenopathy [Normal Inguinal Nodes] : no inguinal lymphadenopathy [Grossly Normal Strength/Tone] : grossly normal strength/tone [No Focal Deficits] : no focal deficits [Normal Gait] : normal gait [Normal Affect] : the affect was normal [Normal Insight/Judgement] : insight and judgment were intact [de-identified] : occasional erythematous papular rash

## 2024-06-28 NOTE — PROGRESS NOTES
Reason for Visit:  Miley Botello is a 73 y.o. female who presents for Follow-up DSME Visit    DSME - Global Assessment    Referring Provider: Dr. Nguyen  Marital Status: .    Readiness to Learn: demonstrates willingness to learn and demonstrates ability to learn  Preferred learning method: observing, reading and writing, listening, and doing    Household Composition: living independently, alone    Demographics:   Difficulties with: financial  Race/Ethnic Origin: White/    Type of Diabetes: Type 2    Patient Active Problem List    Diagnosis Date Noted    Mediastinal lymphadenopathy 10/25/2023    Lung nodules 10/25/2023    Trigger middle finger of right hand 10/24/2023    Trigger ring finger of left hand 10/24/2023    S/P lumpectomy, left breast 10/24/2023    Peripheral neuropathy 10/24/2023    Overweight 10/24/2023    Nephrolithiasis 10/24/2023    Musculoskeletal pain 10/24/2023    Left ureteral stone 10/24/2023    Invasive ductal carcinoma of breast, left (Multi) 10/24/2023    Hypercalcemia 10/24/2023    Hydronephrosis 10/24/2023    Ortega catheter status 10/24/2023    DM type 2 with diabetic mixed hyperlipidemia (Multi) 10/24/2023    HLD (hyperlipidemia) 04/01/2022    History of breast cancer 04/01/2022         Lab Results   Component Value Date    HGBA1C 6.7 (A) 11/30/2023    HGBA1C 6.8 (A) 03/22/2022    HGBA1C 6.5 (A) 09/27/2021    HGBA1C 6.7 03/16/2021    HGBA1C 6.7 07/14/2020    HGBA1C 8.3 04/27/2020       Health Utilization Past 12 Months:     Diabetes Self-Management Skills and Behaviors:   Do you exercise regularly?: Yes. 5+ times/week.  Physical Activity : walking and get 8k steps/day  Yes    Diabetes Medications: oral agents and non-insulin injectables  Current Outpatient Medications   Medication Sig Dispense Refill    atorvastatin (Lipitor) 10 mg tablet TAKE 1 TABLET BY MOUTH EVERY DAY 90 tablet 3    biotin 1 mg tablet Take 1 tablet (1 mg) by mouth once daily.      cholecalciferol  (Vitamin D-3) 125 MCG (5000 UT) capsule Take 1 capsule (125 mcg) by mouth once daily.      cyanocobalamin (Vitamin B-12) 1,000 mcg tablet Take 1 tablet (1,000 mcg) by mouth twice a day.      DULoxetine (Cymbalta) 30 mg DR capsule Take 1 capsule (30 mg) by mouth once daily. Do not crush or chew. 30 capsule 11    gabapentin (Neurontin) 300 mg capsule Take 1 capsule at bedtime x 2 weeks. Then take 2 capsules at bedtime. 180 capsule 1    HYDROcodone-acetaminophen (Norco) 5-325 mg tablet Take 1 tablet by mouth every 4 hours if needed for severe pain (7 - 10).      metFORMIN (Glucophage) 1,000 mg tablet Take 1 tablet (1,000 mg) by mouth 2 times a day. 180 tablet 1    OneTouch Ultra Test strip CHECK BLOOD GLUCOSE ONCE A DAY      repaglinide (Prandin) 1 mg tablet TAKE 1 TABLET 2 TIMES DAILY, 15 MINUTES BEFORE MEALS 180 tablet 1    VITAMIN B COMPLEX-FOLIC ACID ORAL Take 1 tablet by mouth once daily. ;vitamin B complex-folic acid 0.4 mg Tablet       No current facility-administered medications for this visit.     Not on Prandin  Injection/Infusion Sites: abdominal wall. Appropriate disposal of sharps. Appropriate storage of insulin.    Monitorng: None Pt does not want to perform fingersticks.    Started a professional whitney pro sensor to learn D89   SN [id number] Exp. 11/30/2024    Inserted in left upper arm using clean technique and use of skin tac. Pt scheduled on 7/12 to come to see Laura to download Whitney pro sensor. Pt plans to bring copy of log for review.  Hypoglycemia: None      Reproductive/Currently Pregnant : No.  Do you use birth control? : No  Are you planning to become pregnant in the future? : Yes  Have you reached Menopause? : Yes    Diabetes Assessment:   My level of stress is high: disagree.  I struggle with making changes in my life: neutral.  How do you handle stress: Pt reports political stress, moving in August, Pt will find people to talk to someone or meditate to help relieve stress. Also learning how  to say no.     DSME - Meal Planning and Diet Recall    How many meals do you eat in per day: three.  Which meals do you tend to skip: none  What do you drink with and between meals: water    *Self-reports 4# weight loss since starting Ozempic.    Diet Recall:   Breakfast: 3 tbsp humus and small jersey round, small plum with water       Topics Covered and Impression:    DSME Topics Covered During Visit:   Reducing Your Risk For Other Wisam Concerns  Reviewed Chronic Complications/Risks Related to Diabetes  Reviewing Medication Classes  Being Physically Active  Ways to Deal With Diabetes Symptoms  Healthy Meal Plan  Reviewed Use of CGM pro device and how to care for sensor during 2 week wear. Pt scheduled to return on 7/12 to download report and should be given to Dr. Nguyen for interpretation.     Pt requested nutrition consult for weight management- Carla palencia was able to place order today so patient can schedule.    DSME Topics for Follow-Up:   Learning to [place] with Stress, Depression and Other Concerns  Reducing Your Risk For Other Wisam Concerns  Healthy Meal Plan  MyPlate Method  Sick Day Rules  Whitney Pro CGM report review    Time: I personally spent a total of 60 minutes with the patient providing diabetes self-management education. Visit documentation will be sent electronically to referring provider.     Pantera Casillas, MARYCHUYN, RN, [facility]

## 2024-07-03 DIAGNOSIS — E11.69 DM TYPE 2 WITH DIABETIC MIXED HYPERLIPIDEMIA (MULTI): ICD-10-CM

## 2024-07-03 DIAGNOSIS — E78.2 DM TYPE 2 WITH DIABETIC MIXED HYPERLIPIDEMIA (MULTI): ICD-10-CM

## 2024-07-03 RX ORDER — METFORMIN HYDROCHLORIDE 1000 MG/1
1000 TABLET ORAL 2 TIMES DAILY
Qty: 180 TABLET | Refills: 0 | Status: SHIPPED | OUTPATIENT
Start: 2024-07-03

## 2024-07-09 ENCOUNTER — APPOINTMENT (OUTPATIENT)
Dept: AUDIOLOGY | Facility: CLINIC | Age: 74
End: 2024-07-09
Payer: MEDICARE

## 2024-07-09 ENCOUNTER — APPOINTMENT (OUTPATIENT)
Dept: OTOLARYNGOLOGY | Facility: CLINIC | Age: 74
End: 2024-07-09
Payer: MEDICARE

## 2024-07-09 ENCOUNTER — APPOINTMENT (OUTPATIENT)
Dept: ENDOCRINOLOGY | Facility: CLINIC | Age: 74
End: 2024-07-09
Payer: MEDICARE

## 2024-07-12 ENCOUNTER — APPOINTMENT (OUTPATIENT)
Dept: ENDOCRINOLOGY | Facility: CLINIC | Age: 74
End: 2024-07-12
Payer: MEDICARE

## 2024-07-12 ENCOUNTER — NURSE TRIAGE (OUTPATIENT)
Dept: ENDOCRINOLOGY | Facility: CLINIC | Age: 74
End: 2024-07-12

## 2024-07-12 DIAGNOSIS — E78.2 DM TYPE 2 WITH DIABETIC MIXED HYPERLIPIDEMIA (MULTI): ICD-10-CM

## 2024-07-12 DIAGNOSIS — E11.69 DM TYPE 2 WITH DIABETIC MIXED HYPERLIPIDEMIA (MULTI): ICD-10-CM

## 2024-07-12 NOTE — TELEPHONE ENCOUNTER
Patient came into clinic this morning to have Whitney Professional sensor removed. Tolerated well no c/o pain or discomfort.

## 2024-07-14 DIAGNOSIS — G63 POLYNEUROPATHY ASSOCIATED WITH UNDERLYING DISEASE (MULTI): ICD-10-CM

## 2024-07-15 ENCOUNTER — DOCUMENTATION (OUTPATIENT)
Dept: ENDOCRINOLOGY | Facility: CLINIC | Age: 74
End: 2024-07-15
Payer: MEDICARE

## 2024-07-15 DIAGNOSIS — G63 POLYNEUROPATHY ASSOCIATED WITH UNDERLYING DISEASE (MULTI): ICD-10-CM

## 2024-07-15 RX ORDER — GABAPENTIN 300 MG/1
CAPSULE ORAL
Qty: 180 CAPSULE | Refills: 1 | Status: SHIPPED | OUTPATIENT
Start: 2024-07-15 | End: 2024-07-16 | Stop reason: WASHOUT

## 2024-07-15 NOTE — PROGRESS NOTES
Entry from 7/12/2024 at 3:25 pm.    Whitney Pro download received.  She has an appointment with the nutritionist on 7/22.   Dietary changes to address the post-prandial hyperglycemia will be very helpful.  Will discuss increasing Ozempic from 0.5 mg to 1 mg/week with patient.  [employer] message sent.

## 2024-07-16 RX ORDER — GABAPENTIN 300 MG/1
CAPSULE ORAL
Qty: 180 CAPSULE | Refills: 1 | Status: SHIPPED | OUTPATIENT
Start: 2024-07-16

## 2024-07-22 ENCOUNTER — APPOINTMENT (OUTPATIENT)
Dept: ENDOCRINOLOGY | Facility: CLINIC | Age: 74
End: 2024-07-22
Payer: MEDICARE

## 2024-07-22 ENCOUNTER — PATIENT MESSAGE (OUTPATIENT)
Dept: ENDOCRINOLOGY | Facility: CLINIC | Age: 74
End: 2024-07-22

## 2024-07-22 DIAGNOSIS — G63 POLYNEUROPATHY ASSOCIATED WITH UNDERLYING DISEASE (MULTI): ICD-10-CM

## 2024-07-22 RX ORDER — GABAPENTIN 300 MG/1
CAPSULE ORAL
Qty: 180 CAPSULE | Refills: 1 | Status: SHIPPED | OUTPATIENT
Start: 2024-07-22

## 2024-08-08 RX ORDER — SEMAGLUTIDE 0.68 MG/ML
INJECTION, SOLUTION SUBCUTANEOUS
COMMUNITY
Start: 2023-06-20 | End: 2024-08-14 | Stop reason: WASHOUT

## 2024-08-12 ENCOUNTER — APPOINTMENT (OUTPATIENT)
Dept: ENDOCRINOLOGY | Facility: CLINIC | Age: 74
End: 2024-08-12
Payer: MEDICARE

## 2024-08-12 VITALS — BODY MASS INDEX: 27.79 KG/M2 | HEIGHT: 62 IN | WEIGHT: 151 LBS

## 2024-08-12 DIAGNOSIS — E11.69 DM TYPE 2 WITH DIABETIC MIXED HYPERLIPIDEMIA (MULTI): ICD-10-CM

## 2024-08-12 DIAGNOSIS — E78.2 DM TYPE 2 WITH DIABETIC MIXED HYPERLIPIDEMIA (MULTI): ICD-10-CM

## 2024-08-12 DIAGNOSIS — Z71.3 DIETARY COUNSELING: Primary | ICD-10-CM

## 2024-08-12 PROCEDURE — 97802 MEDICAL NUTRITION INDIV IN: CPT | Performed by: DIETITIAN, REGISTERED

## 2024-08-12 NOTE — PROGRESS NOTES
"Initial Nutrition Assessment    Patient was referred to nutrition by Dr. Nguyen for education on healthy eating for consideration of Type 2. Other PMHX significant for HLD. Pertinent labs reviewed which show recent A1c of 6.7%, Not actively checking BG levels at this time on a meter. CGM not covered by insurance. Has personal goals of weight loss and proper BG management with implementation of healthy eating. Recently medication Ozempic dose was increased. Tolerating well. Has had a 7 lbs weight loss to date.    Diet recall reveals a consistent meal pattern with rare skipped meals, as well as implementation of well balanced meals with attention to consistent lean protein, fruits/vegetables/complex CHO, and healthy fat food sources in appropriate portions most often. Some meals are inadequate in protein but this happens less frequently. Fluids meeting recommendations in type and amount with water as primary beverage. Pt is not incorporating consistent exercise due to neuropathy and would likely benefit from increased structured activity to assist in achieving goals as tolerated. See all interventions/recommendations below as discussed during visit this day.     Patient reported symptoms: None    Anthropometrics:  Height:   Ht Readings from Last 1 Encounters:   06/24/24 1.575 m (5' 2.01\")      Weight:   Wt Readings from Last 10 Encounters:   06/24/24 72 kg (158 lb 11.7 oz)   04/11/24 72 kg (158 lb 11.2 oz)   11/30/23 70.9 kg (156 lb 4.8 oz)   10/25/23 72 kg (158 lb 11.7 oz)   06/20/23 71.7 kg (158 lb)   04/19/23 72.2 kg (159 lb 2.7 oz)   12/21/22 73.9 kg (162 lb 14.7 oz)   12/14/22 73.2 kg (161 lb 6 oz)   11/01/22 72.1 kg (159 lb)   10/17/22 70.4 kg (155 lb 3.3 oz)      Current BMI:   BMI Readings from Last 1 Encounters:   06/24/24 29.02 kg/m²        Labs:  Lab Results   Component Value Date    HGBA1C 6.7 (A) 11/30/2023      Lab Results   Component Value Date    CHOL 140 09/27/2021    CHOL 187 03/16/2021    CHOL 186 " "07/14/2020     Lab Results   Component Value Date    HDL 43.4 09/27/2021    HDL 45.3 03/16/2021    HDL 44.4 07/14/2020     No results found for: \"LDLCALC\"  Lab Results   Component Value Date    TRIG 142 09/27/2021    TRIG 204 (H) 03/16/2021    TRIG 221 (H) 07/14/2020       Malnutrition Screening:  Significant Unintentional weight loss: No  Eating less than 75% of usual intake for more than 2 weeks: No  Potential Signs of Inflammation: No    Recommended Malnutrition Diagnosis: No malnutrition identified    Diet Recall-  Breakfast- 2 HB eggs and a serving of fruit OR low CHO granola with a Greek yogurt and berries and sometimes sweetened with honey OR maple syrup OR eggs with toast and PB   Lunch- skips OR has salads with various vegetables and olive oil and vinegar and avocado OR out to eat with a soup, chili or a salad  Dinner- various proteins such as chicken with various vegetables such as broccoli and cabbage   Daily Snacks- tomato with cheese OR avocado OR banana with PB OR cheese and crackers OR occasional couple of tablespoons of Keto ice cream  Beverages- water throughout the day   Alcohol- very rare  Physical Activity- difficult due to neuropathy    Other pertinent patient reported Information:  - Recently increased Ozempic dose this week per Dr. Nguyen  - Has lost 7 lbs as a result of medication Ozempic  - Has a personal goal of some more weight loss- unsire of exact amounts  - Has goals of incorporating healthy eating for overall nutrition and weight loss desires  - Admits to not using a glucose meter at home. Used only a professional CGM in the past.   - Last A1c noted of 6.7%.    Nutrition Diagnosis: Food and nutrition-related knowledge deficit related to lack of recent exposure to information as evidenced by  verbalizes incomplete information .    Readiness to Learn:  Cognitive ability: Alert and oriented  Motivation to learn: Interested  Family Support: Unable to assess- family not " present  Instruction provided to: Patient  Patient learns best by: Multiple methods  Factors affecting learning: None   Physical limitations affecting learning: None    Education Materials Provided:   Your Mediterranean Meal Plan Booklet    Nutrition Interventions/Recommendations for 8/12/2024:  - Please refer to your book entitled: Your Mediterranean Meal Plan, and follow Mediterranean Diet eating guidelines as reviewed.  - The Healthy Plate style of eating can be a helpful tool for incorporating healthy balanced meals in appropriate portions. (Healthy Plate: Start with a 9-inch diameter plate. Fill 1/2 the plate with non-starchy vegetables, 1/4 of the plate with whole grains or starchy vegetables, and 1/4 of the plate with a lean source of protein.   - Aim for a total of 2-3 servings of healthy carbohydrate foods at each main meal (30-45 grams of carbs).   - Consider pre-planning healthy meals for the week. Refer to your book for both menu and recipe ideas to get you started.  - Keep up the great work with your water intakes.   - Add activity weekly as discussed such as walking, water exercise. Yoga and chair exercises.  - Follow-up with nutrition in 6 weeks.      Nutrition Monitoring & Evaluation: adherence to recommendations and patient stated goals    Need for follow-up:  6 weeks    Referred by: Dr. Patrick PETTIT Billing Type: Medical Nutrition Assessment, each 15 min increment, for 3 increments.    SIGNATURE:   Yaritza Lozano RD, GURJIT, LD, [facility]                                                        DATE:   8/12/2024

## 2024-08-12 NOTE — PATIENT INSTRUCTIONS
-  Please refer to your book entitled: Your Mediterranean Meal Plan, and follow Mediterranean Diet eating guidelines as reviewed.  - The Healthy Plate style of eating can be a helpful tool for incorporating healthy balanced meals in appropriate portions. (Healthy Plate: Start with a 9-inch diameter plate. Fill 1/2 the plate with non-starchy vegetables, 1/4 of the plate with whole grains or starchy vegetables, and 1/4 of the plate with a lean source of protein.   - Aim for a total of 2-3 servings of healthy carbohydrate foods at each main meal (30-45 grams of carbs).   - Consider pre-planning healthy meals for the week. Refer to your book for both menu and recipe ideas to get you started.  - Keep up the great work with your water intakes.   - Add activity weekly as discussed such as walking, water exercise. Yoga and chair exercises.  - Follow-up with nutrition in 6 weeks.

## 2024-08-14 RX ORDER — SEMAGLUTIDE 1.34 MG/ML
1 INJECTION, SOLUTION SUBCUTANEOUS
COMMUNITY

## 2024-08-14 NOTE — PROGRESS NOTES
"Patient is sent at the request of Dr. Nguyen for my opinion regarding Type 2 diabetes.  My recommendations below will be communicated back to the requesting provider by way of shared medical record.    Recommendations:   Continue metformin 1000mg BID and Ozempic 1mg once weekly  ________________________________________________________________________    Subjective   Past Medical History:  Patient Active Problem List   Diagnosis    Trigger middle finger of right hand    Trigger ring finger of left hand    S/P lumpectomy, left breast    Peripheral neuropathy    Overweight    Nephrolithiasis    Musculoskeletal pain    Left ureteral stone    Invasive ductal carcinoma of breast, left (Multi)    Hypercalcemia    Hydronephrosis    HLD (hyperlipidemia)    History of breast cancer    Ortega catheter status    DM type 2 with diabetic mixed hyperlipidemia (Multi)    Mediastinal lymphadenopathy    Lung nodules     Interim:  Miley Botello is a 73 y.o. female presents today for new patient visit with endo PharmD for Type 2 Diabetes Mellitus. Last seen by Maya Nguyen on 4/11/24 where Ozempic was started (through [facility]) and repaglinide was stopped. Since then, pt saw DMSE and RD which she found very helpful, and wore a rmoan pro leading to a dose increase of Ozempic to 1mg in July 2024.    Today the patient reports she is doing well and denies acute concerns. She has not had any ADRs w/ her Ozempic start/increase, and states she is now down to ~151 lbs at home (started at 159 lbs) which she is happy with. She has been eating more of a mediterranean diet, as well as exercising more, with a goal to get her weight down to ~140 lbs.    Diabetes Pharmacotherapy:    Ozempic 1mg once weekly  Has taken 2 injections so far, has 3.5 pens left at home  Metformin 1g BID    Adherence: denies non-adherence    Previously trialed meds:    Repaglinide - switched to Ozempic    Social:  Current diet: in general, a \"healthy\" diet  , talked " with a dietician on 8/12 and pt found it very helpful, currently trying to eat a mediterranean diet  Current exercise: 8,000 steps/day; water aerobics and walking with weights at gym, going to gym 2 days/week    Allergies:  Bacitracin, Ciprofloxacin, Miconazole nitrate, Fluticasone propionate, Nickel, and Sulfa (sulfonamide antibiotics)    Medication list:  Current Outpatient Medications   Medication Instructions    atorvastatin (LIPITOR) 10 mg, oral, Daily    biotin 1 mg tablet 1 tablet, oral, Daily    cholecalciferol (Vitamin D-3) 125 MCG (5000 UT) capsule 1 capsule, oral, Daily    cyanocobalamin (Vitamin B-12) 1,000 mcg tablet 1 tablet, oral, 2 times daily    DULoxetine (CYMBALTA) 30 mg, oral, Daily, Do not crush or chew.    gabapentin (Neurontin) 300 mg capsule Take 2 capsules at bedtime.    HYDROcodone-acetaminophen (Norco) 5-325 mg tablet 1 tablet, oral, Every 4 hours PRN    metFORMIN (GLUCOPHAGE) 1,000 mg, oral, 2 times daily    OneTouch Ultra Test strip CHECK BLOOD GLUCOSE ONCE A DAY    repaglinide (Prandin) 1 mg tablet TAKE 1 TABLET 2 TIMES DAILY, 15 MINUTES BEFORE MEALS    semaglutide (Ozempic) 0.25 mg or 0.5 mg (2 mg/3 mL) pen injector subcutaneous    VITAMIN B COMPLEX-FOLIC ACID ORAL 1 tablet, oral, Daily, ;vitamin B complex-folic acid 0.4 mg Tablet        Objective   Last Recorded Vitals:  BP Readings from Last 3 Encounters:   04/11/24 134/74   10/25/23 174/64   06/20/23 158/81     Wt Readings from Last 3 Encounters:   08/12/24 68.5 kg (151 lb)   06/24/24 72 kg (158 lb 11.7 oz)   04/11/24 72 kg (158 lb 11.2 oz)     BMI Readings from Last 1 Encounters:   08/12/24 27.62 kg/m²      Labs  A1C  Lab Results   Component Value Date    HGBA1C 6.7 (A) 11/30/2023    HGBA1C 6.8 (A) 03/22/2022    HGBA1C 6.5 (A) 09/27/2021   --> 6.8% on 4/11/24 per endo documentation    BMP/LFTs  Lab Results   Component Value Date    CALCIUM 11.0 (H) 10/13/2022     10/13/2022    K 4.9 10/13/2022    CO2 26 10/13/2022      10/13/2022    BUN 28 (H) 10/13/2022    CREATININE 0.76 10/13/2022    GFRF 84 10/13/2022    ALT 13 05/06/2022    AST 10 05/06/2022    ALKPHOS 51 05/06/2022    BILITOT 0.4 05/06/2022     Lipids  Lab Results   Component Value Date    TRIG 142 09/27/2021    CHOL 140 09/27/2021    LDLF 68 09/27/2021    HDL 43.4 09/27/2021     Urine Microalbumin  Lab Results   Component Value Date    MICROALBCREA 50.8 (H) 04/27/2020     ASCVD risk  The 10-year ASCVD risk score (Cassandra DAUGHERTY, et al., 2019) is: 22.2%    Values used to calculate the score:      Age: 73 years      Sex: Female      Is Non- : No      Diabetic: Yes      Tobacco smoker: No      Systolic Blood Pressure: 134 mmHg      Is BP treated: No      HDL Cholesterol: 43.4 mg/dL      Total Cholesterol: 140 mg/dL    Home glucose monitoring:  Hypoglycemia: denies frequent s/sx of hypoglycemia, states she felt she may have started to go low once a few weeks ago (active, not much eaten that day) but felt better after a snack  Pt does not monitor home BG      Assessment/Plan   Type 2 Diabetes Mellitus  Goal A1C <7%, at goal as of 4/2024. No home Bgs to review, does not report frequent s/sx of hypoglycemia. Pt is tolerating current therapy and glucose data from her past roman pro indicated she was at/near her TIR/GMI goals prior to increasing her Ozempic dose. Despite lack of BG data since dose increase, it is very likely she has continued to be well controlled. Pt wished to discuss future directions, such as up-titrating Ozempic and down-titrating metformin (goal for more weight loss), however no changes were made today d/t only having been on current Ozempic dose for 2 weeks and lack of BG data. Is seeing endo next week, where this can be discussed further and it was recommended that she have some BG data, could consider repeating a roman pro in the future.    Plan:  Continue: metformin 1000mg BID and Ozempic 1mg once weekly  Home glucose monitoring: Patient  encouraged to continue SMBG at least 2-3x/week, alternating FBG and 2hr PPBG  Education Provided to Patient: encouraged continued positive lifestyle modification including diet and exercise  Primary prevention:   Therapy: Moderate intensity statin  LDL result meets goal  Renal:  CKD: stage 2 - GFR 60-89  ACR:   Renal protective agents: GLP1  DM medications are dosed appropriately for renal function  Labs: RFP, ACR, and lipids ordered by endo, pt states she will complete Wednesday  PharmD follow-up: none scheduled at this time, endo may place order for additional f/u if deemed appropriate  Endo follow-up: 8/22/24    **Of note, pt was notified to  more Ozempic from [facility] while at her appt today, however it appears she was sent the 0.5mg dose instead of the 1mg dose. The pt did not take the 0.5mg dose, and instead author will coordinate an updated Rx for the correct dose being sent to [facility]    Patient agreeable to plan as above, contact information provided for any future questions or concerns.    Wayne Jacobsen, PharmD    Type of encounter: in person  Provider on site: Carla Giron    Continue all meds under the continuation of care with the referring provider and clinical pharmacy team.

## 2024-08-14 NOTE — PROGRESS NOTES
"FUV for diabetes. LV with me 4/2024.    Subjective   Miley Botello is a 73 y.o. female with a hx of type 2 diabetes, HLD, breast cancer, who presents for management of diabetes.    Dx: 2017  HbA1c: 7.5% (8/22/2024), 6.8% (4/11/2024), 6.7% (11/30/2023), 6.8% (03/2022), 6.5% (09/2021)  Current regimen: metformin 1g BID, Ozempic 1 mg/week  Past medications: none  Complications: peripheral neuropathy    SMBG: does not check    Hypoglycemia: no  Hypoglycemia awareness: no    Diet: twice a day, low-carb  Eats breakfast and dinner    Exercise: 10,000 steps/day    Today:  -doing well    ROS  General: no fever or chills  CV: no chest pain   Respiratory: no shortness of breath  MSK: no lower extremity edema  Neuro: no headache or dizziness  See HPI for Endocrine ROS    Objective    Physical Exam  Blood pressure 125/72, pulse 70, height 1.575 m (5' 2\"), weight 68.5 kg (151 lb).  General: not in acute distress  HEENT: ADRIAN YOU  Thyroid: no goiter  Neuro: alert and oriented x 3    Current Outpatient Medications:     atorvastatin (Lipitor) 10 mg tablet, TAKE 1 TABLET BY MOUTH EVERY DAY, Disp: 90 tablet, Rfl: 3    biotin 1 mg tablet, Take 1 tablet (1 mg) by mouth once daily., Disp: , Rfl:     cholecalciferol (Vitamin D-3) 125 MCG (5000 UT) capsule, Take 1 capsule (125 mcg) by mouth once daily., Disp: , Rfl:     cyanocobalamin (Vitamin B-12) 1,000 mcg tablet, Take 1 tablet (1,000 mcg) by mouth twice a day., Disp: , Rfl:     gabapentin (Neurontin) 300 mg capsule, Take 2 capsules at bedtime., Disp: 180 capsule, Rfl: 1    metFORMIN (Glucophage) 1,000 mg tablet, TAKE 1 TABLET BY MOUTH TWICE A DAY, Disp: 180 tablet, Rfl: 0    semaglutide (Ozempic) 1 mg/dose (4 mg/3 mL) pen injector, Inject 1 mg under the skin 1 (one) time per week., Disp: , Rfl:     VITAMIN B COMPLEX-FOLIC ACID ORAL, Take 1 tablet by mouth once daily. ;vitamin B complex-folic acid 0.4 mg Tablet, Disp: , Rfl:     HYDROcodone-acetaminophen (Norco) 5-325 mg tablet, " Take 1 tablet by mouth every 4 hours if needed for severe pain (7 - 10)., Disp: , Rfl:     OneTouch Ultra Test strip, CHECK BLOOD GLUCOSE ONCE A DAY, Disp: , Rfl:     Assessment/Plan   Miley Botello is a 73 y.o. female with a hx of type 2 diabetes, HLD, breast cancer, who presents for management of diabetes.    Type 2 diabetes mellitus  HbA1c: 7.5% (8/22/2024), 6.8% (4/11/2024), 6.7% (11/30/2023), 6.8% (03/2022), 6.5% (09/2021)  Current regimen: metformin 1g BID, Ozempic 1 mg/week  Eye exam: +mild NPDR (10/2023)  Urine microalbumin: 31.2 micrograms/mg (08/2024)  Podiatry:  +neuropathy (LV 11/2023)  Lipids: HDL 55, LDL 75,  (08/2024) on atorvastatin 10 mg QDAY    A1c: 7.5% today.  Increase in A1c despite weight loss (8 lbs) since starting Ozempic.  Eating less but eating more vegetables.  No change in exercise regimen.    Received one shipment (4-month supply) 1 mg dose of Ozempic but also received another shipment of the 0.5 mg dose recently.  Has been on the 1 mg dose for 2 weeks now. No GI side effects.  She does not want to lose weight too quickly.  Will have her continue the 1 mg dose for the next few months. Advised her to let me know in 3 months regarding her weight. Given the rise in A1c, increasing to the 2 mg dose will be helpful.    Qutenza appears to be covered through [employer] and bill.  Will arrange appointment with Christ Giron CNP.    PLAN:  -continue Ozempic 1 mg/week  -continue metformin 1 g BID  -continue gabapentin 600 mg at bedtime for neuropathy  -check HbA1c before next visit  -Rojas with Carla Giron CNP    2. Hypercalcemia  Intermittently elevated calcium at least since 03/2022.  Highest calcium 11.1.    Labs from 05/2022  Ca 10.2  PTH 34.8  D25OH 49  Creatinine 0.75  PTHrP: normal    Labs from 08/2024  Ca 10.8  PTH 25.5  D25OH 58  Cre 1.00    DXA (08/2021): [facility] (compared to 2018)  L1-L4: BMD 1.368  T-score 1.6  Z-score 3.0  change vs previous: -3.6%*  Left fem neck: BMD  0.926  T-score -0.8  Z-score 0.7  change vs previous -1.8%  Left total: BMD 1.025  T-score 0.1  Z-score 1.4    Hx of kidney stones: once, approx 2020  D3 supplement: 5000 units per day  Calcium: no supplements    Calcium mildly elevated with low-normal PTH. Cannot rule out mild primary PHPT but she takes biotin which can falsely lower PTH.  Takes biotin (5000 mcg) daily. Will have her hold biotiin for 5 days prior to next labs.    PLAN:  -check RFP, D25OH, PTH, SPEP before next visit (hold biotin for 5 days)  -repeat DXA before next visit  -continue vitamin D supplements    Follow-up in 5 months  Uses [employer].

## 2024-08-19 ENCOUNTER — APPOINTMENT (OUTPATIENT)
Dept: ENDOCRINOLOGY | Facility: CLINIC | Age: 74
End: 2024-08-19
Payer: MEDICARE

## 2024-08-19 DIAGNOSIS — E78.2 DM TYPE 2 WITH DIABETIC MIXED HYPERLIPIDEMIA (MULTI): Primary | ICD-10-CM

## 2024-08-19 DIAGNOSIS — E11.69 DM TYPE 2 WITH DIABETIC MIXED HYPERLIPIDEMIA (MULTI): Primary | ICD-10-CM

## 2024-08-19 PROCEDURE — 99211 OFF/OP EST MAY X REQ PHY/QHP: CPT

## 2024-08-21 ENCOUNTER — LAB (OUTPATIENT)
Dept: LAB | Facility: LAB | Age: 74
End: 2024-08-21
Payer: MEDICARE

## 2024-08-21 DIAGNOSIS — E83.52 HYPERCALCEMIA: ICD-10-CM

## 2024-08-21 DIAGNOSIS — E11.69 DM TYPE 2 WITH DIABETIC MIXED HYPERLIPIDEMIA (MULTI): ICD-10-CM

## 2024-08-21 DIAGNOSIS — E78.2 DM TYPE 2 WITH DIABETIC MIXED HYPERLIPIDEMIA (MULTI): ICD-10-CM

## 2024-08-21 LAB
25(OH)D3 SERPL-MCNC: 58 NG/ML (ref 30–100)
ALBUMIN SERPL BCP-MCNC: 4.6 G/DL (ref 3.4–5)
ANION GAP SERPL CALC-SCNC: 18 MMOL/L (ref 10–20)
BUN SERPL-MCNC: 22 MG/DL (ref 6–23)
CALCIUM SERPL-MCNC: 10.8 MG/DL (ref 8.6–10.6)
CHLORIDE SERPL-SCNC: 103 MMOL/L (ref 98–107)
CHOLEST SERPL-MCNC: 156 MG/DL (ref 0–199)
CHOLESTEROL/HDL RATIO: 2.9
CO2 SERPL-SCNC: 22 MMOL/L (ref 21–32)
CREAT SERPL-MCNC: 1 MG/DL (ref 0.5–1.05)
CREAT UR-MCNC: 134.7 MG/DL (ref 20–320)
EGFRCR SERPLBLD CKD-EPI 2021: 60 ML/MIN/1.73M*2
GLUCOSE SERPL-MCNC: 128 MG/DL (ref 74–99)
HDLC SERPL-MCNC: 54.5 MG/DL
LDLC SERPL CALC-MCNC: 75 MG/DL
MICROALBUMIN UR-MCNC: 42 MG/L
MICROALBUMIN/CREAT UR: 31.2 UG/MG CREAT
NON HDL CHOLESTEROL: 102 MG/DL (ref 0–149)
PHOSPHATE SERPL-MCNC: 4 MG/DL (ref 2.5–4.9)
POTASSIUM SERPL-SCNC: 4.7 MMOL/L (ref 3.5–5.3)
PTH-INTACT SERPL-MCNC: 25.5 PG/ML (ref 18.5–88)
SODIUM SERPL-SCNC: 138 MMOL/L (ref 136–145)
TRIGL SERPL-MCNC: 133 MG/DL (ref 0–149)
VLDL: 27 MG/DL (ref 0–40)

## 2024-08-21 PROCEDURE — 80061 LIPID PANEL: CPT

## 2024-08-21 PROCEDURE — 83970 ASSAY OF PARATHORMONE: CPT

## 2024-08-21 PROCEDURE — 36415 COLL VENOUS BLD VENIPUNCTURE: CPT

## 2024-08-21 PROCEDURE — 82043 UR ALBUMIN QUANTITATIVE: CPT

## 2024-08-21 PROCEDURE — 82306 VITAMIN D 25 HYDROXY: CPT

## 2024-08-21 PROCEDURE — 82570 ASSAY OF URINE CREATININE: CPT

## 2024-08-21 PROCEDURE — 80069 RENAL FUNCTION PANEL: CPT

## 2024-08-22 ENCOUNTER — APPOINTMENT (OUTPATIENT)
Dept: ENDOCRINOLOGY | Facility: CLINIC | Age: 74
End: 2024-08-22
Payer: MEDICARE

## 2024-08-22 VITALS
HEART RATE: 70 BPM | DIASTOLIC BLOOD PRESSURE: 72 MMHG | WEIGHT: 151 LBS | SYSTOLIC BLOOD PRESSURE: 125 MMHG | HEIGHT: 62 IN | BODY MASS INDEX: 27.79 KG/M2

## 2024-08-22 DIAGNOSIS — E78.2 DM TYPE 2 WITH DIABETIC MIXED HYPERLIPIDEMIA (MULTI): ICD-10-CM

## 2024-08-22 DIAGNOSIS — E83.52 HYPERCALCEMIA: Primary | ICD-10-CM

## 2024-08-22 DIAGNOSIS — E11.69 DM TYPE 2 WITH DIABETIC MIXED HYPERLIPIDEMIA (MULTI): ICD-10-CM

## 2024-08-22 DIAGNOSIS — Z78.0 POST-MENOPAUSAL: ICD-10-CM

## 2024-08-22 DIAGNOSIS — M85.80 OSTEOPENIA, UNSPECIFIED LOCATION: ICD-10-CM

## 2024-08-22 LAB — POC HEMOGLOBIN A1C: 7.5 % (ref 4.2–6.5)

## 2024-08-22 PROCEDURE — 3008F BODY MASS INDEX DOCD: CPT | Performed by: STUDENT IN AN ORGANIZED HEALTH CARE EDUCATION/TRAINING PROGRAM

## 2024-08-22 PROCEDURE — 3078F DIAST BP <80 MM HG: CPT | Performed by: STUDENT IN AN ORGANIZED HEALTH CARE EDUCATION/TRAINING PROGRAM

## 2024-08-22 PROCEDURE — 3048F LDL-C <100 MG/DL: CPT | Performed by: STUDENT IN AN ORGANIZED HEALTH CARE EDUCATION/TRAINING PROGRAM

## 2024-08-22 PROCEDURE — 3060F POS MICROALBUMINURIA REV: CPT | Performed by: STUDENT IN AN ORGANIZED HEALTH CARE EDUCATION/TRAINING PROGRAM

## 2024-08-22 PROCEDURE — 1159F MED LIST DOCD IN RCRD: CPT | Performed by: STUDENT IN AN ORGANIZED HEALTH CARE EDUCATION/TRAINING PROGRAM

## 2024-08-22 PROCEDURE — 99215 OFFICE O/P EST HI 40 MIN: CPT | Performed by: STUDENT IN AN ORGANIZED HEALTH CARE EDUCATION/TRAINING PROGRAM

## 2024-08-22 PROCEDURE — 3074F SYST BP LT 130 MM HG: CPT | Performed by: STUDENT IN AN ORGANIZED HEALTH CARE EDUCATION/TRAINING PROGRAM

## 2024-08-22 PROCEDURE — 83036 HEMOGLOBIN GLYCOSYLATED A1C: CPT | Performed by: STUDENT IN AN ORGANIZED HEALTH CARE EDUCATION/TRAINING PROGRAM

## 2024-08-22 PROCEDURE — 1036F TOBACCO NON-USER: CPT | Performed by: STUDENT IN AN ORGANIZED HEALTH CARE EDUCATION/TRAINING PROGRAM

## 2024-08-22 NOTE — PATIENT INSTRUCTIONS
Continue Ozempic 1 mg per week  Please let me know in 3 months if you want to increase to the 2 mg dose  Please have blood work done a few days before your next appointment with me. **Remember to stop biotin 5 days before you have the blood drawn**  Please schedule bone density scan for early January   [facility]: #[phone]  General Radiology: #[phone]

## 2024-09-10 ENCOUNTER — APPOINTMENT (OUTPATIENT)
Dept: ENDOCRINOLOGY | Facility: CLINIC | Age: 74
End: 2024-09-10
Payer: MEDICARE

## 2024-09-10 VITALS
HEART RATE: 74 BPM | TEMPERATURE: 97.6 F | RESPIRATION RATE: 18 BRPM | DIASTOLIC BLOOD PRESSURE: 79 MMHG | WEIGHT: 148.1 LBS | BODY MASS INDEX: 27.09 KG/M2 | SYSTOLIC BLOOD PRESSURE: 131 MMHG

## 2024-09-10 DIAGNOSIS — E11.40 TYPE 2 DIABETES MELLITUS WITH CHRONIC PAINFUL DIABETIC NEUROPATHY (MULTI): Primary | ICD-10-CM

## 2024-09-10 PROCEDURE — 99215 OFFICE O/P EST HI 40 MIN: CPT | Performed by: NURSE PRACTITIONER

## 2024-09-10 ASSESSMENT — PAIN SCALES - GENERAL: PAINLEVEL: 0-NO PAIN

## 2024-09-10 NOTE — PATIENT INSTRUCTIONS
-Treatment areas may be sensitive for a few days to heat (e.g., hot showers/baths, direct sunlight, vigorous exercise).  -You may use ice packs to the treatment area  -You may use over the counter analgesics as per the instructions as needed for pain.   -Wash socks and feet when you get home.

## 2024-09-10 NOTE — PROGRESS NOTES
The patient is presenting here today for Qutenza patch applications and removal   Indication: Type 2 diabetes with painful peripheral neuropathy     Right and left feet prepped with Ethyl Chloride spray.   The procedure for Qutenza application and removal was explained, questions were answered.     -The treatment area (painful area including areas of hypersensitivity and allodynia) were identified by the provider.  -Bilateral feet were examined prior to application of Qutenza to detect skin lesions related to underlying neuropathy or vascular insufficiency.  -The topical anesthetic was dried and feet wiped with dry cloth.   -Qutenza patches were fit to the treatment area.   -The treatment area was gently washed with mild soap and water, and dried thoroughly.    -The package was opened and the Qutenza patches were removed.   -The adhesive side of Qutenza is covered by a clear, unprinted, diagonally cut release liner.   -The patches were peeled and released the liner back  -The adhesive side of Qutenza was placed on the treatment area on bilateral feet.   -A total of 4 patches were used with 280 sq cm units for a total of 1120 units for both feet. Started with left foot.  -To ensure Qutenza maintains contact with the treatment area, the patches were wrapped with Coban.   -The Qutenza patches were left in place for 30 minutes on the feet.   -Nitrile gloves were used to remove the patches gently and slowly rolling inward  -The treatment area(s) were cleansed with the enclosed Cleansing Gel and left on for at least one minute.   -The Cleansing Gel was removed with a dry wipe and dried thoroughly.  -All treatment material were disposed in biohazard bag.     Patient Education and Instruction was provided as follows:  -Treatment areas may be sensitive for a few days to heat (e.g., hot showers/baths, direct sunlight, vigorous exercise).  -The patient may use ice packs to the treatment areas  -The patient may use over the  counter analgesics as per the instructions as needed for pain.   -Instructed patient to wash socks and feet when they get home.      Recommend follow up in 12 weeks, around Dec 10th

## 2024-09-25 ENCOUNTER — APPOINTMENT (OUTPATIENT)
Dept: ENDOCRINOLOGY | Facility: CLINIC | Age: 74
End: 2024-09-25
Payer: MEDICARE

## 2024-09-25 VITALS — WEIGHT: 147 LBS | BODY MASS INDEX: 27.05 KG/M2 | HEIGHT: 62 IN

## 2024-09-25 DIAGNOSIS — E11.69 DM TYPE 2 WITH DIABETIC MIXED HYPERLIPIDEMIA (MULTI): Primary | ICD-10-CM

## 2024-09-25 DIAGNOSIS — Z71.3 DIETARY COUNSELING: ICD-10-CM

## 2024-09-25 DIAGNOSIS — E78.2 DM TYPE 2 WITH DIABETIC MIXED HYPERLIPIDEMIA (MULTI): Primary | ICD-10-CM

## 2024-09-25 PROCEDURE — 97803 MED NUTRITION INDIV SUBSEQ: CPT | Performed by: DIETITIAN, REGISTERED

## 2024-09-25 NOTE — PROGRESS NOTES
Follow-up Nutrition Assessment    Interval History: Patient presents for follow-up nutrition appointment for for consideration for Type 2 DM. Since last nutrition visit on 8/12/24, pt with a 4 lbs weight loss. Recent Aic shows an increase. Current A1c at 7.5% and was previously at a 6.7%. Feels this increase in A1c was related to several things such as changes in activity levels, higher CHO diet than she is following now, as well as very high stress levels over the summer months which are fortunately no longer present.    With regards to eating, reports focusing on healthy eating following the Mediterranean eating guidelines. Incorporating more non-starchy vegetables, having a larger meal in the afternoon and a smaller meal in the evening, and being mindful of adding good sources of protein such as seafood, chicken and tofu at meals. States water intakes could be better, averaging about 32 ounces daily. Still taking medication Ozempic and it is helpful with reducing appetite but is experiencing some constipation with it. Due to such trying to be mindful with incorporating a good amount of fiber more recently. Also incorporating more consistent activity recently. Increasing steps/walking on th treadmill, and going to the gym 3-4 times per week doing water aerobics as well as a weight circuit with resistance training machines. See all interventions/recommendations below as discussed during visit this day.     Nutrition Interventions/Recommendations from last visit scheduled on 8/12/24:  - Please refer to your book entitled: Your Mediterranean Meal Plan, and follow Mediterranean Diet eating guidelines as reviewed.  - The Healthy Plate style of eating can be a helpful tool for incorporating healthy balanced meals in appropriate portions. (Healthy Plate: Start with a 9-inch diameter plate. Fill 1/2 the plate with non-starchy vegetables, 1/4 of the plate with whole grains or starchy vegetables, and 1/4 of the plate with a  "lean source of protein.   - Aim for a total of 2-3 servings of healthy carbohydrate foods at each main meal (30-45 grams of carbs).   - Consider pre-planning healthy meals for the week. Refer to your book for both menu and recipe ideas to get you started.  - Keep up the great work with your water intakes.   - Add activity weekly as discussed such as walking, water exercise. Yoga and chair exercises.  - Follow-up with nutrition in 6 weeks.      Anthropometrics:  Height:   Ht Readings from Last 1 Encounters:   08/22/24 1.575 m (5' 2\")      Weight:   Wt Readings from Last 10 Encounters:   09/10/24 67.2 kg (148 lb 1.6 oz)   08/22/24 68.5 kg (151 lb)   08/12/24 68.5 kg (151 lb)   06/24/24 72 kg (158 lb 11.7 oz)   04/11/24 72 kg (158 lb 11.2 oz)   11/30/23 70.9 kg (156 lb 4.8 oz)   10/25/23 72 kg (158 lb 11.7 oz)   06/20/23 71.7 kg (158 lb)   04/19/23 72.2 kg (159 lb 2.7 oz)   12/21/22 73.9 kg (162 lb 14.7 oz)      Current BMI:   BMI Readings from Last 1 Encounters:   09/10/24 27.09 kg/m²        Malnutrition Screening:  Significant Unintentional weight loss: No  Eating less than 75% of usual intake for more than 2 weeks: No  Potential Signs of Inflammation: No    Recommended Malnutrition Diagnosis: No malnutrition identified    Patient reported Information:  - Feeling very motivated and pleased with another 4 lbs weight loss  - Focusing on eating within the Mediterranean eating guidelines  - Incorporating more non-starchy vegetables  - Having a larger meal in the afternoon and a smaller meal in the evening  - Being mindful of adding good sources of protein such as seafood, chicken and tofu  - Feels water intakes could be better, averaging about 32 ounces daily  - Still taking medication Ozempic and it is helpful with reducing appetite  - Does with the medication however have some constipation so trying to be mindful with incorporating a good amount of fiber  - Incorporating more consistent activity recently- increasing " steps/walking on th treadmill, and going to the gym 3-4 times per week doing water aerobics as well as a weight circuit with resistance training machines   - Frustrated with recent increase in A1c and feels it was related to several things such as changes in activity levels and a higher CHO diet than she is following now, as well as very high stress levels over the summer which are fortunately no longer present.    Nutrition Diagnosis: Food and nutrition-related knowledge deficit related to lack of recent exposure to information as evidenced by  verbalizes incomplete information .     Readiness to Learn:  Cognitive ability: Alert and oriented  Motivation to learn: Interested  Family Support: Unable to assess- family not present  Instruction provided to: Patient  Patient learns best by: Multiple methods  Factors affecting learning: None   Physical limitations affecting learning: None    Education Materials Provided:   None this visit    Nutrition Interventions/Recommendations for 9/25/2024:  - Keep up the great work with incorporating the Mediterranean Meal Plan.  - Continue to aim for no more than 2-3 servings of healthy carbohydrate foods at each main meal (30-45 grams of carbs).   - Continue to pre-plan healthy meals for the week.   - Aim for 64 ounces of water daily and aim for 25 grams of fiber daily to help with constipation as discussed.   - Keep up the great work with your weekly exercise.  - Follow-up with nutrition next year.      Nutrition Monitoring & Evaluation: adherence to recommendations and patient stated goals    Need for follow-up:  Next year    Referred by: Dr. Patrick PETTIT Billing Type: Medical Nutrition Re-Assessment, each 15 min increment, for 2 increments.    SIGNATURE:   Yaritza Lozano RD, CSAPRYL, LD, CDCES                                                          DATE:   9/25/2024

## 2024-09-25 NOTE — PATIENT INSTRUCTIONS
- Keep up the great work with incorporating the Mediterranean Meal Plan.  - Continue to aim for no more than 2-3 servings of healthy carbohydrate foods at each main meal (30-45 grams of carbs).   - Continue to pre-plan healthy meals for the week.   - Aim for 64 ounces of water daily and aim for 25 grams of fiber daily to help with constipation as discussed.   - Keep up the great work with your weekly exercise.  - Follow-up with nutrition next year.     No

## 2024-10-01 DIAGNOSIS — E11.69 DM TYPE 2 WITH DIABETIC MIXED HYPERLIPIDEMIA (MULTI): ICD-10-CM

## 2024-10-01 DIAGNOSIS — E78.2 DM TYPE 2 WITH DIABETIC MIXED HYPERLIPIDEMIA (MULTI): ICD-10-CM

## 2024-10-01 RX ORDER — METFORMIN HYDROCHLORIDE 1000 MG/1
1000 TABLET ORAL 2 TIMES DAILY
Qty: 180 TABLET | Refills: 0 | Status: SHIPPED | OUTPATIENT
Start: 2024-10-01

## 2024-10-13 ENCOUNTER — PATIENT MESSAGE (OUTPATIENT)
Dept: ENDOCRINOLOGY | Facility: CLINIC | Age: 74
End: 2024-10-13
Payer: MEDICARE

## 2024-10-13 DIAGNOSIS — G63 POLYNEUROPATHY ASSOCIATED WITH UNDERLYING DISEASE (MULTI): ICD-10-CM

## 2024-10-13 DIAGNOSIS — E78.2 DM TYPE 2 WITH DIABETIC MIXED HYPERLIPIDEMIA (MULTI): ICD-10-CM

## 2024-10-13 DIAGNOSIS — E11.69 DM TYPE 2 WITH DIABETIC MIXED HYPERLIPIDEMIA (MULTI): ICD-10-CM

## 2024-10-14 RX ORDER — GABAPENTIN 300 MG/1
CAPSULE ORAL
Qty: 180 CAPSULE | Refills: 1 | Status: SHIPPED | OUTPATIENT
Start: 2024-10-14

## 2024-10-28 ENCOUNTER — HOSPITAL ENCOUNTER (OUTPATIENT)
Dept: RADIOLOGY | Facility: HOSPITAL | Age: 74
Discharge: HOME | End: 2024-10-28
Payer: MEDICARE

## 2024-10-28 DIAGNOSIS — R91.8 LUNG NODULES: ICD-10-CM

## 2024-10-28 DIAGNOSIS — R59.0 MEDIASTINAL LYMPHADENOPATHY: ICD-10-CM

## 2024-10-28 PROCEDURE — 71250 CT THORAX DX C-: CPT | Performed by: RADIOLOGY

## 2024-10-28 PROCEDURE — 71250 CT THORAX DX C-: CPT

## 2024-11-16 DIAGNOSIS — E11.69 DM TYPE 2 WITH DIABETIC MIXED HYPERLIPIDEMIA (MULTI): ICD-10-CM

## 2024-11-16 DIAGNOSIS — E78.2 DM TYPE 2 WITH DIABETIC MIXED HYPERLIPIDEMIA (MULTI): ICD-10-CM

## 2024-11-19 RX ORDER — METFORMIN HYDROCHLORIDE 1000 MG/1
1000 TABLET ORAL 2 TIMES DAILY
Qty: 180 TABLET | Refills: 1 | Status: SHIPPED | OUTPATIENT
Start: 2024-11-19

## 2024-11-19 RX ORDER — ATORVASTATIN CALCIUM 10 MG/1
10 TABLET, FILM COATED ORAL DAILY
Qty: 90 TABLET | Refills: 1 | Status: SHIPPED | OUTPATIENT
Start: 2024-11-19

## 2024-12-10 ENCOUNTER — APPOINTMENT (OUTPATIENT)
Dept: ENDOCRINOLOGY | Facility: CLINIC | Age: 74
End: 2024-12-10
Payer: MEDICARE

## 2024-12-11 ENCOUNTER — PATIENT MESSAGE (OUTPATIENT)
Dept: ENDOCRINOLOGY | Facility: CLINIC | Age: 74
End: 2024-12-11
Payer: MEDICARE

## 2024-12-13 ENCOUNTER — OFFICE VISIT (OUTPATIENT)
Dept: HEMATOLOGY/ONCOLOGY | Facility: CLINIC | Age: 74
End: 2024-12-13
Payer: MEDICARE

## 2024-12-13 VITALS
WEIGHT: 149.58 LBS | BODY MASS INDEX: 27.36 KG/M2 | SYSTOLIC BLOOD PRESSURE: 100 MMHG | HEART RATE: 76 BPM | TEMPERATURE: 98.1 F | DIASTOLIC BLOOD PRESSURE: 76 MMHG

## 2024-12-13 DIAGNOSIS — Z85.3 HISTORY OF LEFT BREAST CANCER: ICD-10-CM

## 2024-12-13 DIAGNOSIS — Z92.21 HISTORY OF AROMATASE INHIBITOR THERAPY: Primary | ICD-10-CM

## 2024-12-13 DIAGNOSIS — Z92.3 HISTORY OF EXTERNAL BEAM RADIATION THERAPY: ICD-10-CM

## 2024-12-13 PROCEDURE — 3060F POS MICROALBUMINURIA REV: CPT | Performed by: NURSE PRACTITIONER

## 2024-12-13 PROCEDURE — 1160F RVW MEDS BY RX/DR IN RCRD: CPT | Performed by: NURSE PRACTITIONER

## 2024-12-13 PROCEDURE — 99213 OFFICE O/P EST LOW 20 MIN: CPT | Performed by: NURSE PRACTITIONER

## 2024-12-13 PROCEDURE — 3048F LDL-C <100 MG/DL: CPT | Performed by: NURSE PRACTITIONER

## 2024-12-13 PROCEDURE — 3074F SYST BP LT 130 MM HG: CPT | Performed by: NURSE PRACTITIONER

## 2024-12-13 PROCEDURE — 1159F MED LIST DOCD IN RCRD: CPT | Performed by: NURSE PRACTITIONER

## 2024-12-13 PROCEDURE — 3078F DIAST BP <80 MM HG: CPT | Performed by: NURSE PRACTITIONER

## 2024-12-13 PROCEDURE — 1126F AMNT PAIN NOTED NONE PRSNT: CPT | Performed by: NURSE PRACTITIONER

## 2024-12-13 ASSESSMENT — PAIN SCALES - GENERAL: PAINLEVEL_OUTOF10: 0-NO PAIN

## 2024-12-13 NOTE — PROGRESS NOTES
Oncology Follow-Up    Miley Botello  [id number]           Breast         AJCC Edition: 7th (AJCC), Diagnosis Date: Aug 2017, [place], T1c pN0 M0       Treatment Synopsis:    Current treatment- observation        Treatment History:    Pt states that she had routine mammogram at time of cancer diagnosis but did not have any palpable findings.  Initial w/u including mammogram, biopsy done at [facility] (reports obtained).  Prior left breast core biopsy in 9/1/06- fat necrosis (no cancer).       6/2/17- Screening b/l mammogram- indeterminate left breast asymmetry noted and additional imaging recc.      6/15/17- Left diagnostic mammogram/U/S- Mammo showed a focal asymmetry in left breast 12:00.  1 X 0.9 X 0.4 cm lobulated hypoechoic left breast mass at 12:00, 2 cm FN.  No abnormalities seen in left axilla.  Breast mass suspicious and biopsy recc     7/6/17- U/s guided biopsy of left breast mass 12:00, 2 cm FN- IDC, grade 3, DCIS, grade 3.  Some features of invasive micropapillary cancer present.  Receptors- ER strongly positive >95%, VA moderately positive 80%, HER 2+ by IHC (equivocal), HER2/CEP17  ratio 1.9 (equivocal)     8/15/17- Left lumpectomy and SN biopsy- IDC, 1.4 cm, grade 3, indeterminate LVI, 0/2 LNs involved.  Margins (deep new margin)- cauterized atypical intraductal proliferation suspicious for DCIS, all other margins negative.  Path staging: pT1cN0.  Receptors-  ER strongly positive >95%, VA strongly positive 85%, HER 1+ by IHC (negative)     Oncotype DX RS Score 16 (low risk category; 10% 10 yr distant recurrence risk with endocrine therapy)     9/27/17 - 10/24/17-  left breast radiation completed      11/2017- adjuvant endocrine therapy with letrozole.     1/5/22  - Breast Cancer Index testing showed a 9.5 risk of recurrent distant disease and a 5.2% risk of recurrent distant disease. There is no benefit with extended therapy. Plan to discontinue letrozole in November 2022.       Subjective    Hope  "presents for her Oncology Follow Up Visit. She states \"Life is really nice\". She has lost weight (on Ozempic) and feels very good. She does see breast changes with the weight loss (scarring more prominent and breasts is smaller). Miley is going to the gym to exercise. She asks about a recent CT scan that showed calcium deposits on her coronary arteries. She is asking if she should have follow up on this. Miley sees dermatology yearly. She denies any unusual headaches, balance issues, depression, cough, shortness of breath, problems swallowing, changes in chest/breast area, abdominal pain, bone or muscle pain, vaginal bleeding, rectal bleeding, blood in the urine, vaginal dryness, swelling arms or legs, new or unusual skin moles or lesions.       Objective      Vitals:    12/13/24 1115   BP: 100/76   Pulse: 76   Temp: 36.7 °C (98.1 °F)        Constitutional: Well developed, alert/oriented x3, no distress, cooperative   Eyes: clear sclera   ENMT: mucous membranes moist, no apparent lesions   Head/Neck: Neck supple, no bruits   Respiratory/Thorax: Patent airways, normal breath sounds with good chest expansion   Cardiovascular: Regular rate and rhythm, no murmurs, 2+ equal pulses of the extremities,   Gastrointestinal: Nondistended, soft, non-tender, no masses palpable, no organomegaly   Musculoskeletal: ROM intact, no joint swelling, normal strength   Extremities: normal extremities, no edema, cyanosis, contusions or wounds   Neurological: alert and oriented x3,  normal strength   Breast:  left breast and axilla without masses, nodules, skin changes.  Right breast and axilla without masses nodules, skin changes mammoplasty is without masses, nodules, skin changes, discharge, well healed incisions.   Lymphatic: No significant lymphadenopathy   Psychological: Appropriate mood and behavior   Skin: Warm and dry, no lesions, no rashes      Physical Exam     Lab Results   Component Value Date    WBC 10.4 10/13/2022    HGB 12.0 " 10/13/2022    HCT 38.4 10/13/2022    MCV 91 10/13/2022     10/13/2022       Chemistry    Lab Results   Component Value Date/Time     08/21/2024 1110    K 4.7 08/21/2024 1110     08/21/2024 1110    CO2 22 08/21/2024 1110    BUN 22 08/21/2024 1110    CREATININE 1.00 08/21/2024 1110    Lab Results   Component Value Date/Time    CALCIUM 10.8 (H) 08/21/2024 1110    ALKPHOS 51 05/06/2022 1330    AST 10 05/06/2022 1330    ALT 13 05/06/2022 1330    BILITOT 0.4 05/06/2022 1330           Imaging:    Status Exam Begun Exam Ended   Final 6/24/2024 09:44 6/24/2024 09:53     Study Result    Narrative & Impression   Interpreted By:  Elizabeth Paniagua,   STUDY:  BI MAMMO BILATERAL SCREENING TOMOSYNTHESIS;  6/24/2024 9:53 am      ACCESSION NUMBER(S):  [id number]      ORDERING CLINICIAN:  JULIANA MIXON      INDICATION:  Screening. History of left lumpectomy and radiation; bilateral  reduction.      COMPARISON:  All prior mammograms that are available at the time of interpretation.      FINDINGS:  2D and tomosynthesis images were reviewed at 1 mm slice thickness.      Density:  The breast tissue is almost entirely fatty.      Postsurgical changes including parenchymal scarring, dystrophic  calcifications, and surgical clips are again seen in the  superolateral left breast. Diffuse left breast skin thickening is  again seen, in keeping with history of radiation treatment. Bilateral  post reduction changes are again seen. No suspicious masses or  calcifications are identified in either breast.      IMPRESSION:  No mammographic evidence of malignancy.      BI-RADS CATEGORY:  BI-RADS Category:  2 Benign.  Recommendation:  Annual Screening.  Recommended Date:  1 Year.  Laterality:  Bilateral.       Assessment/Plan    Miley is a 73 yo woman with a history of T1cN0 IDC G-3, DCIS diagnosed August 2017. She is s/p partial mastectomy, SLNB, XRT, and completed a five year course of letrozole in November 2022 as BCI showed  no benefit with extended therapy. There is no evidence of recurrent disease on today's exam.     Plan:  Exam is negative.  Bone density scheduled for January 2025.  Refer to Dr. Dutta in cardiology to review testing.  Encouraged monthly breast self exams, plant based diet, keep alcohol <3 drinks/week, exercise at least 2.5 hours/week.  We reviewed signs/symptoms of recurrence including new masses, new pigmented lesion, tugging or pulling of the skin, nipple discharge, rash in or around the chest area, or any new finding that doesn't resolve within a 2-3 weeks.  All of Miley's questions/concerns were addressed.  Over 25 minutes of time was spent with this patient with >50% of the time with education, counseling, and coordination of care.   Hope will see Ya Henriquez NP in June with her mammogram. I will see her back in one year. She will call with any concerns.      Diagnoses and all orders for this visit:  History of left breast cancer  -     Clinic Appointment Request Follow Up; SYD ALVAREZ; [facility] [id number] [address]  -     Clinic Appointment Request Follow Up; SYD ALVAREZ; Future  -     Referral to Cardiology; Future        MELO Mccartney-CNP

## 2024-12-13 NOTE — PATIENT INSTRUCTIONS
Please call us at [phone] option 5 then option 2 with any questions or concerns     1. Exercise 2.5 hours per week; bone strengthening, cardio-vascular, resistance training.  2. Please do self breast exams monthly.  3. Keep alcohol under 3 drinks per week.  4. Sun safety - limit sun exposure from 11a-2p when its at its hottest, apply 15-30 sun block and re-apply every 1-2 hours if perspiring or swimming.  5. Eat a plant based diet, add in oily fishes such as mackerel, tuna, and salmon.  6. Get in at least 1,000 mg of calcium per day through diet or supplement for bone strength. Examples of foods higher in calcium are milk, yogurt, fruited yogurt, oranges, fortified orange juice, almonds, almond milk, broccoli, spinach, bok jeri, mustard greens, puddings, custards, ice cream, fortified cereals, bars, and crackers.   7. Exam today was Negative  8. Please call the office if any new mass or rash in or around breast, or any uncontrolled symptoms that last over 2-3 weeks at [phone].  9. Please schedule Mammogram for June of next year.   10. It was nice seeing you today, Hope! I will see you back this time next year. Have a Happy and Healthy Holiday Season. Thank you for choosing [facility] with your care.

## 2024-12-16 PROBLEM — Z92.21 HISTORY OF AROMATASE INHIBITOR THERAPY: Status: ACTIVE | Noted: 2024-12-16

## 2024-12-16 PROBLEM — Z92.3 HISTORY OF EXTERNAL BEAM RADIATION THERAPY: Status: ACTIVE | Noted: 2024-12-16

## 2024-12-17 ENCOUNTER — TELEPHONE (OUTPATIENT)
Dept: PULMONOLOGY | Facility: HOSPITAL | Age: 74
End: 2024-12-17
Payer: MEDICARE

## 2024-12-17 NOTE — TELEPHONE ENCOUNTER
Spoke with pt regarding her CT results. Per Dr. Walker, lung nodules have been stable for >2 years and suspected that they’re due to a prior infection, so no further radiographic follow-up needed. The  CT also showed moderate coronary artery calcification, which was also seen on October & April 2023 CT chest, and appears similar. A cardiologist referral is recommended. Pt was updated on the CT results and verbalized understanding. Pt stated that she already has a visit scheduled with a cardiologist. She verbalized appreciation of the call.

## 2025-01-02 NOTE — PROGRESS NOTES
"FUV for diabetes. LV with me 8/2024.    Subjective   Miley Botello is a 74 y.o. female with a hx of type 2 diabetes, HLD, breast cancer, who presents for management of diabetes.    Dx: 2017  HbA1c: 6.2% (1/6/2025), 7.5% (8/22/2024), 6.8% (4/11/2024), 6.7% (11/30/2023), 6.8% (03/2022), 6.5% (09/2021)  Current regimen: metformin 1g BID, Ozempic 1 mg/week  Past medications: none  Complications: peripheral neuropathy    SMBG: does not check    Hypoglycemia: no  Hypoglycemia awareness: no    Diet: twice a day, low-carb  Eats breakfast and dinner    Exercise: working out 4-5 days per week the gym ([employer]; 5 day per week, water aerobics for 1 hour; twice a week treadmill 3 times per week, weight circuit)    Today:  -doing well  -working out 4-5 days per week    ROS  General: no fever or chills  CV: no chest pain   Respiratory: no shortness of breath  MSK: no lower extremity edema  Neuro: no headache or dizziness  See HPI for Endocrine ROS    Objective    Physical Exam  Blood pressure 126/82, height 1.562 m (5' 1.5\"), weight 68.5 kg (151 lb).  General: not in acute distress  HEENT: KENYATTA, EOMI  Thyroid: no goiter  Neuro: alert and oriented x 3    Current Outpatient Medications:     atorvastatin (Lipitor) 10 mg tablet, TAKE 1 TABLET BY MOUTH EVERY DAY, Disp: 90 tablet, Rfl: 1    biotin 1 mg tablet, Take 1 tablet (1 mg) by mouth once daily., Disp: , Rfl:     cholecalciferol (Vitamin D-3) 125 MCG (5000 UT) capsule, Take 1 capsule (125 mcg) by mouth once daily., Disp: , Rfl:     cyanocobalamin (Vitamin B-12) 1,000 mcg tablet, Take 1 tablet (1,000 mcg) by mouth twice a day., Disp: , Rfl:     gabapentin (Neurontin) 300 mg capsule, Take 2 capsules at bedtime., Disp: 180 capsule, Rfl: 1    metFORMIN (Glucophage) 1,000 mg tablet, TAKE 1 TABLET BY MOUTH TWICE A DAY, Disp: 180 tablet, Rfl: 1    semaglutide (Ozempic) 1 mg/dose (4 mg/3 mL) pen injector, Inject 1 mg under the skin 1 (one) time per week., Disp: , Rfl:     VITAMIN B " COMPLEX-FOLIC ACID ORAL, Take 1 tablet by mouth once daily. ;vitamin B complex-folic acid 0.4 mg Tablet, Disp: , Rfl:     Assessment/Plan   Miley Botello is a 74 y.o. female with a hx of type 2 diabetes, HLD, breast cancer, who presents for management of diabetes.    Type 2 diabetes mellitus  HbA1c: 6.2% (1/6/2025), 7.5% (8/22/2024), 6.8% (4/11/2024), 6.7% (11/30/2023), 6.8% (03/2022), 6.5% (09/2021)  Current regimen: metformin 1g BID, Ozempic 1 mg/week  Eye exam: +mild NPDR (12/2024)  Urine microalbumin: 31.2 micrograms/mg (08/2024)  Podiatry:  +neuropathy (LV 11/2023)-due  Lipids: HDL 55, LDL 75,  (08/2024) on atorvastatin 10 mg QDAY    A1c: 6.2%!   Has lost some weight but would like to lose more.  Working out at the gym 4-5 days per week now!  Had some dietary indiscretions over the holidays but returning to her usual diet.    Will send dose change request for Ozempic 2 mg.  She asked if she could every come off of metformin. I told her that if A1c remains in the low to mid 6% range at next visit, we can consider tapering down/off metformin.    Will need to reapply for [facility] in March. Provided patient with re-enrollment forms today. She will drop off at the  in March.    PLAN:  -increase Ozempic 2 mg/week  -continue metformin 1 g BID  -continue gabapentin 600 mg at bedtime for neuropathy  -Rojas with Carla Giron CNP (2nd treatment 1/14/2025)  -refer to podiatry    2. Hypercalcemia  Intermittently elevated calcium at least since 03/2022.  Highest calcium 11.1.    Hx of kidney stones: once, approx 2020  D3 supplement: 5000 units per day  Calcium: no supplements; eats greek yogurt and almond milk    DXA (08/2021): [facility] (compared to 2018)  L1-L4: BMD 1.368  T-score 1.6  Z-score 3.0  change vs previous: -3.6%*  Left fem neck: BMD 0.926  T-score -0.8  Z-score 0.7  change vs previous -1.8%  Left total: BMD 1.025  T-score 0.1  Z-score 1.4  1/3 Radius: BMD 0.793  T-score NA    DXA  (01/2024): [facility]  L1-L4: BMD 1.476  T-score 2.5  Z-score 4.2 change vs previous: +7.9%*  Left fem neck: BMD 0.979  T-score -0.4  Z-score 1.4  change vs previous:  Left total hip: BMD 1.043  T-score 0.3  Z-score 1.9  change vs previous: -1.8%  1/3 Radius: BMD 0.774  T-score -1.3  Z-score 0.8  change vs previous: -2.4%    Labs from 05/2022  Ca 10.2  PTH 34.8  D25OH 49  Creatinine 0.75  PTHrP: normal    Labs from 08/2024  Ca 10.8  PTH 25.5  D25OH 58  Cre 1.00    Labs from 01/2025  Ca 10.6  PTH 25.6  D25OH 66  Cre 0.82    Calcium mildly elevated with low-normal PTH. Cannot rule out mild primary PHPT but she takes biotin (5000 mcg daily) which can falsely lower PTH.  Labs from 1/5/2025 were done with biotin held for 6 days. PTH remains at the low end of normal.  Will obtain 24H urine calcium and evaluate for other causes of hypercalcemia.    She is leaving for [place] at the end of the month. She will do the ordered labs when she returns.    Stable BMD.    PLAN:  -check SPEP, Light chain assay, Vitamin A, TSH  -obtain 24 hour urine collection for calcium  -continue vitamin D supplements    Follow-up in 5 months  Uses [employer].

## 2025-01-06 ENCOUNTER — HOSPITAL ENCOUNTER (OUTPATIENT)
Dept: RADIOLOGY | Facility: CLINIC | Age: 75
Discharge: HOME | End: 2025-01-06
Payer: MEDICARE

## 2025-01-06 ENCOUNTER — LAB (OUTPATIENT)
Dept: LAB | Facility: LAB | Age: 75
End: 2025-01-06
Payer: MEDICARE

## 2025-01-06 DIAGNOSIS — Z78.0 POST-MENOPAUSAL: ICD-10-CM

## 2025-01-06 DIAGNOSIS — E11.69 DM TYPE 2 WITH DIABETIC MIXED HYPERLIPIDEMIA (MULTI): ICD-10-CM

## 2025-01-06 DIAGNOSIS — E83.52 HYPERCALCEMIA: ICD-10-CM

## 2025-01-06 DIAGNOSIS — E78.2 DM TYPE 2 WITH DIABETIC MIXED HYPERLIPIDEMIA (MULTI): ICD-10-CM

## 2025-01-06 LAB
25(OH)D3 SERPL-MCNC: 66 NG/ML (ref 30–100)
ALBUMIN SERPL BCP-MCNC: 4.4 G/DL (ref 3.4–5)
ANION GAP SERPL CALC-SCNC: 11 MMOL/L (ref 10–20)
BUN SERPL-MCNC: 28 MG/DL (ref 6–23)
CALCIUM SERPL-MCNC: 10.6 MG/DL (ref 8.6–10.6)
CHLORIDE SERPL-SCNC: 103 MMOL/L (ref 98–107)
CO2 SERPL-SCNC: 29 MMOL/L (ref 21–32)
CREAT SERPL-MCNC: 0.82 MG/DL (ref 0.5–1.05)
EGFRCR SERPLBLD CKD-EPI 2021: 76 ML/MIN/1.73M*2
EST. AVERAGE GLUCOSE BLD GHB EST-MCNC: 131 MG/DL
GLUCOSE SERPL-MCNC: 112 MG/DL (ref 74–99)
HBA1C MFR BLD: 6.2 %
PHOSPHATE SERPL-MCNC: 3.4 MG/DL (ref 2.5–4.9)
POTASSIUM SERPL-SCNC: 4.2 MMOL/L (ref 3.5–5.3)
PTH-INTACT SERPL-MCNC: 25.6 PG/ML (ref 18.5–88)
SODIUM SERPL-SCNC: 139 MMOL/L (ref 136–145)

## 2025-01-06 PROCEDURE — 83970 ASSAY OF PARATHORMONE: CPT

## 2025-01-06 PROCEDURE — 77080 DXA BONE DENSITY AXIAL: CPT | Performed by: RADIOLOGY

## 2025-01-06 PROCEDURE — 80069 RENAL FUNCTION PANEL: CPT

## 2025-01-06 PROCEDURE — 77080 DXA BONE DENSITY AXIAL: CPT

## 2025-01-06 PROCEDURE — 82306 VITAMIN D 25 HYDROXY: CPT

## 2025-01-06 PROCEDURE — 83036 HEMOGLOBIN GLYCOSYLATED A1C: CPT

## 2025-01-07 ENCOUNTER — APPOINTMENT (OUTPATIENT)
Dept: ENDOCRINOLOGY | Facility: CLINIC | Age: 75
End: 2025-01-07
Payer: MEDICARE

## 2025-01-07 VITALS
SYSTOLIC BLOOD PRESSURE: 126 MMHG | DIASTOLIC BLOOD PRESSURE: 82 MMHG | WEIGHT: 151 LBS | BODY MASS INDEX: 27.79 KG/M2 | HEIGHT: 62 IN

## 2025-01-07 DIAGNOSIS — E83.52 HYPERCALCEMIA: Primary | ICD-10-CM

## 2025-01-07 DIAGNOSIS — E11.40 TYPE 2 DIABETES MELLITUS WITH CHRONIC PAINFUL DIABETIC NEUROPATHY: ICD-10-CM

## 2025-01-07 PROCEDURE — 3074F SYST BP LT 130 MM HG: CPT | Performed by: STUDENT IN AN ORGANIZED HEALTH CARE EDUCATION/TRAINING PROGRAM

## 2025-01-07 PROCEDURE — 3079F DIAST BP 80-89 MM HG: CPT | Performed by: STUDENT IN AN ORGANIZED HEALTH CARE EDUCATION/TRAINING PROGRAM

## 2025-01-07 PROCEDURE — 3008F BODY MASS INDEX DOCD: CPT | Performed by: STUDENT IN AN ORGANIZED HEALTH CARE EDUCATION/TRAINING PROGRAM

## 2025-01-07 PROCEDURE — 3044F HG A1C LEVEL LT 7.0%: CPT | Performed by: STUDENT IN AN ORGANIZED HEALTH CARE EDUCATION/TRAINING PROGRAM

## 2025-01-07 PROCEDURE — 99215 OFFICE O/P EST HI 40 MIN: CPT | Performed by: STUDENT IN AN ORGANIZED HEALTH CARE EDUCATION/TRAINING PROGRAM

## 2025-01-07 PROCEDURE — G2211 COMPLEX E/M VISIT ADD ON: HCPCS | Performed by: STUDENT IN AN ORGANIZED HEALTH CARE EDUCATION/TRAINING PROGRAM

## 2025-01-07 ASSESSMENT — ENCOUNTER SYMPTOMS
DEPRESSION: 0
OCCASIONAL FEELINGS OF UNSTEADINESS: 0
LOSS OF SENSATION IN FEET: 1

## 2025-01-07 ASSESSMENT — PATIENT HEALTH QUESTIONNAIRE - PHQ9
2. FEELING DOWN, DEPRESSED OR HOPELESS: NOT AT ALL
SUM OF ALL RESPONSES TO PHQ9 QUESTIONS 1 AND 2: 0
1. LITTLE INTEREST OR PLEASURE IN DOING THINGS: NOT AT ALL

## 2025-01-07 NOTE — PATIENT INSTRUCTIONS
Please do 24 hour urine collection at your earliest convenience  Please have blood work done either when you  the collection jug or when you bring it back to the   I will send in dose change request to [facility] for Ozempic 2 mg dose    <Instructions for 24 hour urine collection>  *Please  a collection jug at any  Lab first*  1. Start the collection at a time that is convenient to you (e.g. 8:00 a.m.).   2. Empty your bladder into the toilet (NOT the container).   3. Record the TIME AND DATE on the container label. This is the “Start Time”.   4. Collect all your urine over the next 24 hours. Urinate in a separate, clean, dry container and then pour ALL urine into your 24 hour container. If you miss the collection of even one sample, the test must be restarted. Return to the Lab for another sample container. If you think you might fill the container before the 24 hour collection is finished, obtain a second container from the Lab.   5. Keep the container in a cool place (e.g. refrigerator).   6. Collect a final urine sample exactly 24 hours after you started your collection (e.g. 8:00 a.m.). Record the TIME AND DATE on the container label. This is the “Finish Time”.

## 2025-01-14 ENCOUNTER — APPOINTMENT (OUTPATIENT)
Dept: ENDOCRINOLOGY | Facility: CLINIC | Age: 75
End: 2025-01-14
Payer: MEDICARE

## 2025-01-14 VITALS
BODY MASS INDEX: 27.97 KG/M2 | SYSTOLIC BLOOD PRESSURE: 108 MMHG | WEIGHT: 152 LBS | DIASTOLIC BLOOD PRESSURE: 80 MMHG | HEIGHT: 62 IN

## 2025-01-14 DIAGNOSIS — G63 POLYNEUROPATHY ASSOCIATED WITH UNDERLYING DISEASE (MULTI): Primary | ICD-10-CM

## 2025-01-14 PROCEDURE — 99215 OFFICE O/P EST HI 40 MIN: CPT | Performed by: NURSE PRACTITIONER

## 2025-01-14 NOTE — PROGRESS NOTES
The patient is presenting here today for Qutenza patch applications and removal   Indication: Type 2 diabetes with painful peripheral neuropathy     Right and left feet prepped with Ethyl Chloride spray.   The procedure for Qutenza application and removal was explained, questions were answered.     -The treatment area (painful area including areas of hypersensitivity and allodynia) were identified by the provider.  -Bilateral feet were examined prior to application of Qutenza to detect skin lesions related to underlying neuropathy or vascular insufficiency.  -The topical anesthetic was dried and feet wiped with dry cloth.   -Qutenza patches were fit to the treatment area.   -The treatment area was gently washed with mild soap and water, and dried thoroughly.    -The package was opened and the Qutenza patches were removed.   -The adhesive side of Qutenza is covered by a clear, unprinted, diagonally cut release liner.   -The patches were peeled and released the liner back  -The adhesive side of Qutenza was placed on the treatment area on bilateral feet.   -A total of 4 patches were used with 280 sq cm units for a total of 1120 units for both feet. Started with left foot.  -To ensure Qutenza maintains contact with the treatment area, the patches were wrapped with Coban.   -The Qutenza patches were left in place for 25 minutes.  Patient was not able to tolerate the full amount of time due to pain.  She reported 12/10 pain in the left foot and 9/10 in the right foot.  She was visibly uncomfortable and having a lot of pain.  She was having difficulty with the washing of her feet.  Reported feeling like she was walking on fire.    -Nitrile gloves were used to remove the patches gently and slowly rolling inward  -The treatment area(s) were cleansed with the enclosed Cleansing Gel and left on for at least one minute.   -The Cleansing Gel was removed with a dry wipe and dried thoroughly.  -All treatment material were disposed  in biohazard bag.   - I was able to place ice packs to both feet.  This gave her some but very minimal relief.   - She walked out prior to me being able to give her paperwork.  I was able to track her down outside the building to give her after care instructions.   - I also sent email to company regarding her pain.      Patient Education and Instruction was provided as follows:  -Treatment areas may be sensitive for a few days to heat (e.g., hot showers/baths, direct sunlight, vigorous exercise).  -The patient may use ice packs to the treatment areas  -The patient may use over the counter analgesics as per the instructions as needed for pain.   -Instructed patient to wash socks and feet when they get home.      Patient declines follow up.

## 2025-01-15 ENCOUNTER — PATIENT MESSAGE (OUTPATIENT)
Dept: ENDOCRINOLOGY | Facility: CLINIC | Age: 75
End: 2025-01-15
Payer: MEDICARE

## 2025-02-03 ENCOUNTER — TELEPHONE (OUTPATIENT)
Dept: ENDOCRINOLOGY | Facility: CLINIC | Age: 75
End: 2025-02-03
Payer: MEDICARE

## 2025-02-03 NOTE — TELEPHONE ENCOUNTER
Left message on patients phone that her medication has arrived and she can come pick it up at her earliest convenience.

## 2025-02-05 ENCOUNTER — TELEPHONE (OUTPATIENT)
Dept: ENDOCRINOLOGY | Facility: CLINIC | Age: 75
End: 2025-02-05
Payer: MEDICARE

## 2025-02-07 ENCOUNTER — TELEPHONE (OUTPATIENT)
Dept: ENDOCRINOLOGY | Facility: CLINIC | Age: 75
End: 2025-02-07
Payer: MEDICARE

## 2025-02-07 NOTE — TELEPHONE ENCOUNTER
Call place to patient to let her know that her medication has arrived, no answer or call back as of yet.

## 2025-02-12 ENCOUNTER — OFFICE VISIT (OUTPATIENT)
Dept: CARDIOLOGY | Facility: HOSPITAL | Age: 75
End: 2025-02-12
Payer: MEDICARE

## 2025-02-12 VITALS
OXYGEN SATURATION: 96 % | WEIGHT: 146 LBS | DIASTOLIC BLOOD PRESSURE: 80 MMHG | HEIGHT: 62 IN | SYSTOLIC BLOOD PRESSURE: 136 MMHG | BODY MASS INDEX: 26.87 KG/M2 | HEART RATE: 65 BPM

## 2025-02-12 DIAGNOSIS — I25.10 CORONARY ARTERIOSCLEROSIS: Primary | ICD-10-CM

## 2025-02-12 DIAGNOSIS — I25.10 CORONARY ARTERY CALCIFICATION SEEN ON CT SCAN: ICD-10-CM

## 2025-02-12 DIAGNOSIS — E78.5 HYPERLIPIDEMIA, UNSPECIFIED HYPERLIPIDEMIA TYPE: ICD-10-CM

## 2025-02-12 DIAGNOSIS — Z85.3 HISTORY OF LEFT BREAST CANCER: ICD-10-CM

## 2025-02-12 DIAGNOSIS — E11.9 DIABETES MELLITUS TYPE II, NON INSULIN DEPENDENT (MULTI): ICD-10-CM

## 2025-02-12 PROCEDURE — 3079F DIAST BP 80-89 MM HG: CPT | Performed by: INTERNAL MEDICINE

## 2025-02-12 PROCEDURE — 1036F TOBACCO NON-USER: CPT | Performed by: INTERNAL MEDICINE

## 2025-02-12 PROCEDURE — G2211 COMPLEX E/M VISIT ADD ON: HCPCS | Performed by: INTERNAL MEDICINE

## 2025-02-12 PROCEDURE — 1159F MED LIST DOCD IN RCRD: CPT | Performed by: INTERNAL MEDICINE

## 2025-02-12 PROCEDURE — 3008F BODY MASS INDEX DOCD: CPT | Performed by: INTERNAL MEDICINE

## 2025-02-12 PROCEDURE — 99214 OFFICE O/P EST MOD 30 MIN: CPT | Performed by: INTERNAL MEDICINE

## 2025-02-12 PROCEDURE — 3075F SYST BP GE 130 - 139MM HG: CPT | Performed by: INTERNAL MEDICINE

## 2025-02-12 PROCEDURE — 99204 OFFICE O/P NEW MOD 45 MIN: CPT | Performed by: INTERNAL MEDICINE

## 2025-02-12 PROCEDURE — 3044F HG A1C LEVEL LT 7.0%: CPT | Performed by: INTERNAL MEDICINE

## 2025-02-12 PROCEDURE — 1160F RVW MEDS BY RX/DR IN RCRD: CPT | Performed by: INTERNAL MEDICINE

## 2025-02-12 RX ORDER — ATORVASTATIN CALCIUM 40 MG/1
40 TABLET, FILM COATED ORAL DAILY
Qty: 90 TABLET | Refills: 3 | Status: SHIPPED | OUTPATIENT
Start: 2025-02-12 | End: 2026-02-12

## 2025-02-12 NOTE — PROGRESS NOTES
Primary Care Physician: Mar Rockwell MD  Date of Visit: 02/12/2025  1:20 PM EST  Location of visit: [facility]     Chief Complaint:   Chief Complaint   Patient presents with    New Patient Visit        HPI / Summary:   Miley Botello is a 74 y.o. female who presents for consultation for coronary artery calcification on CT.  She is a pleasant 74-year-old white female with a past medical history significant for coronary atherosclerosis on CT, type II non-insulin-requiring diabetes, hyperlipidemia, and left-sided breast cancer.  She recently had a CT scan for surveillance of nodules as indicated moderate coronary artery calcification.  She has no cardiac complaints.  She exercises 5 days a week for 1 hour doing aqua aerobics.  In addition she walks up to 5000 steps a day and lifts weights twice a week without chest pain or shortness of breath.  The patient denies chest pain, shortness of breath, palpitations, lightheadedness, syncope, orthopnea, paroxysmal nocturnal dyspnea, lower extremity edema, or bleeding problems.    The patient has no prior history of myocardial infarction, cerebrovascular disease, venous thromboembolic disease, hypertension, peripheral arterial disease/claudication, or bleeding.    She was diagnosed with diabetes in 2017.  She is on Ozempic and metformin.  She also has a history of hyperlipidemia and is tolerating atorvastatin 10 mg for many years.  She has not taken a higher dose before.    She was diagnosed with left-sided breast cancer in 2017.  She underwent lumpectomy and radiation therapy.  She did not receive chemotherapy.    She is allergic to sulfa that caused hives.  She was also intolerant of bacitracin and Neosporin.  She was intolerant to Cipro.  She has a nickel allergy.    She quit smoking at the age of 43 and has a 20-pack-year history of tobacco use.  She denies any alcohol or illicit drug use.  She currently lives alone.  She used to run a rescue Village.    Her  father had a myocardial infarction the age of 64.  No family history of sudden death.          Past Medical History:   Diagnosis Date    BRCA2 negative     Encounter for other preprocedural examination     Pre-op testing    Malignant neoplasm of unspecified site of left female breast 12/21/2022    Invasive ductal carcinoma of breast, left    Myalgia, other site 10/31/2018    Musculoskeletal pain    Overweight 07/31/2020    Overweight    Personal history of irradiation 09/015/2017    Personal history of other endocrine, nutritional and metabolic disease 04/21/2020    History of type 2 diabetes mellitus    Type 2 diabetes mellitus with other specified complication 01/04/2023    DM type 2 with diabetic mixed hyperlipidemia    Urinary tract infection, site not specified     Acute UTI        Past Surgical History:   Procedure Laterality Date    BREAST BIOPSY Left     BREAST LUMPECTOMY Left 08/15/2017    with radiation    OTHER SURGICAL HISTORY  10/31/2018    Left Breast Partial Mastectomy    OTHER SURGICAL HISTORY  11/01/2022    Lithotripsy    OTHER SURGICAL HISTORY  11/01/2022    Bronchoscopy    OTHER SURGICAL HISTORY  10/31/2018    Radiation Therapy    REDUCTION MAMMAPLASTY Bilateral           Social History:   reports that she has quit smoking. Her smoking use included cigarettes. She has never used smokeless tobacco. She reports that she does not drink alcohol and does not use drugs.     Family History:  family history includes Breast cancer (age of onset: 64) in her mother.      Allergies:  Allergies   Allergen Reactions    Bacitracin Other    Ciprofloxacin Myalgia    Miconazole Nitrate Other    Fluticasone Propionate Unknown    Nickel Rash    Sulfa (Sulfonamide Antibiotics) Rash       Outpatient Medications:  Current Outpatient Medications   Medication Instructions    atorvastatin (LIPITOR) 10 mg, oral, Daily    biotin 1 mg tablet 1 tablet, Daily    cholecalciferol (Vitamin D-3) 125 MCG (5000 UT) capsule 1 capsule,  "Daily    cyanocobalamin (Vitamin B-12) 1,000 mcg tablet 1 tablet, 2 times daily    gabapentin (Neurontin) 300 mg capsule Take 2 capsules at bedtime.    metFORMIN (GLUCOPHAGE) 1,000 mg, oral, 2 times daily    Ozempic 1 mg, Once Weekly       Physical Exam:  Vitals:    02/12/25 1309   BP: 136/80   BP Location: Right arm   Pulse: 65   SpO2: 96%   Weight: 66.2 kg (146 lb)   Height: 1.575 m (5' 2\")     Wt Readings from Last 5 Encounters:   02/12/25 66.2 kg (146 lb)   01/14/25 68.9 kg (152 lb)   01/07/25 68.5 kg (151 lb)   12/13/24 67.8 kg (149 lb 9.3 oz)   09/25/24 66.7 kg (147 lb)     Body mass index is 26.7 kg/m².   General: Well-developed well-nourished in no acute distress  HEENT: Normocephalic atraumatic  Neck: Supple, JVP is normal negative hepatojugular reflux 2+ carotid pulses without bruit  Pulmonary: Normal respiratory effort, clear to auscultation  Cardiovascular: No right ventricular heave, normal S1 and S2, 1 out of 6 early peaking systolic ejection murmur at the base no rubs or gallops  Abdomen: Soft nontender nondistended  Extremities: Warm without edema 2+ radial pulses bilaterally 2+ dorsalis pedis pulses bilaterally  Neurologic: Alert and oriented x3  Psychiatric: Normal mood and affect     Last Labs:  CMP:  Recent Labs     01/06/25  1135 08/21/24  1110 10/13/22  1105    138 141   K 4.2 4.7 4.9    103 104   CO2 29 22 26   ANIONGAP 11 18 16   BUN 28* 22 28*   CREATININE 0.82 1.00 0.76   EGFR 76 60*  --    GLUCOSE 112* 128* 174*     Recent Labs     01/06/25  1135 08/21/24  1110 05/06/22  1330 03/30/22  0706 03/29/22  0954 03/22/22  1300   ALBUMIN 4.4 4.6 4.1   < > 4.1 4.2   ALKPHOS  --   --  51  --  59 65   ALT  --   --  13  --  14 12   AST  --   --  10  --  15 13   BILITOT  --   --  0.4  --  0.4 0.4    < > = values in this interval not displayed.     CBC:  Recent Labs     10/13/22  1105 04/21/22  1516 03/31/22  1203   WBC 10.4 8.9 15.9*   HGB 12.0 11.4* 8.5*   HCT 38.4 36.9 27.1*    317 " "144*   MCV 91 90 90     COAG: No results for input(s): \"INR\", \"DDIMERVTE\" in the last 96914 hours.  HEME/ENDO:  Recent Labs     01/06/25  1135 08/22/24  0853 11/30/23  1156 05/06/22  1330   TSH  --   --   --  1.40   HGBA1C 6.2* 7.5* 6.7*  --       CARDIAC: No results for input(s): \"LDH\", \"CKMB\", \"TROPHS\", \"BNP\" in the last 01599 hours.    No lab exists for component: \"CK\", \"CKMBP\"  Recent Labs     08/21/24  1110 09/27/21  0800 03/16/21  1136 07/14/20  1039   CHOL 156 140 187 186   LDLF  --  68 101* 97   LDLCALC 75  --   --   --    HDL 54.5 43.4 45.3 44.4   TRIG 133 142 204* 221*       Last Cardiology Tests:  ECG:  Electrocardiogram performed today that I reviewed shows sinus rhythm possible prior anterior infarct pattern.    Echo:  Echo Results:  No results found for this or any previous visit from the past 3650 days.       Cath:      Stress Test:  Stress Results:  No results found for this or any previous visit from the past 365 days.         Cardiac Imaging:  CT chest without contrast October 28, 2024  HEART AND VESSELS:  There is mild atherosclerotic changes of the thoracic aorta.      Main pulmonary artery and its branches are normal in caliber.      There are moderate coronary artery calcifications the study is not  optimized for evaluation of coronary arteries.      The cardiac chambers are not enlarged.      No evidence of pericardial effusion.    CT abdomen and pelvis March 2022  VESSELS:  There is no aneurysmal dilatation of the abdominal aorta. The IVC  appears normal.      Assessment/Plan   Diagnoses and all orders for this visit:  Coronary arteriosclerosis  -     Nuclear Stress Test; Future  Hyperlipidemia, unspecified hyperlipidemia type  -     atorvastatin (Lipitor) 40 mg tablet; Take 1 tablet (40 mg) by mouth once daily.  -     Lipid panel; Future  -     Hepatic function panel; Future  History of left breast cancer  -     Referral to Cardiology  Coronary artery calcification seen on CT scan  -     ECG " 12 lead (Clinic Performed)  Diabetes mellitus type II, non insulin dependent (Multi)  -     Nuclear Stress Test; Future    In summary Ms. Botello is a pleasant 74-year-old white female with a past medical history significant for coronary atherosclerosis on CT, type II non-insulin-requiring diabetes, hyperlipidemia, and left-sided breast cancer.  She is seemingly asymptomatic from a cardiac perspective.  I did educate her with regards to the significance of coronary calcification, need for risk factor modification, and signs and symptoms of coronary artery disease.  I did increase her atorvastatin to 40 mg daily.  We will repeat labs as indicated below.  She should continue her other cardiovascular medications.  Her blood pressure today is mildly elevated but her home readings are normal.  Given her diabetes and coronary calcification I did recommend an exercise nuclear stress test for further risk stratification.  We will see her back in follow-up in 1 year.      Orders:  Orders Placed This Encounter   Procedures    Lipid panel    Hepatic function panel    ECG 12 lead (Clinic Performed)      Followup Appts:  Future Appointments   Date Time Provider Department Center   6/2/2025  1:00 PM Lisa Agosto DPM [id number] [facility]   6/18/2025 10:45 AM Maya Nguyen MD [id number] [facility]   6/27/2025 11:30 AM [address]   12/18/2025 11:30 AM MELO Mccartney-CNP [id number] [facility]           ____________________________________________________________  Wayne Dutta MD  [facility] [place]  [facility]

## 2025-02-12 NOTE — PATIENT INSTRUCTIONS
Increase atorvastatin to 40 mg daily.  Check a fasting lipid profile and hepatic function panel in 2 months.  Check an exercise nuclear stress test.  Follow-up in 1 year.

## 2025-02-20 ENCOUNTER — HOSPITAL ENCOUNTER (OUTPATIENT)
Dept: CARDIOLOGY | Facility: HOSPITAL | Age: 75
Discharge: HOME | End: 2025-02-20
Payer: MEDICARE

## 2025-02-20 LAB
ATRIAL RATE: 85 BPM
P AXIS: 66 DEGREES
P OFFSET: 191 MS
P ONSET: 141 MS
PR INTERVAL: 162 MS
Q ONSET: 222 MS
QRS COUNT: 14 BEATS
QRS DURATION: 68 MS
QT INTERVAL: 370 MS
QTC CALCULATION(BAZETT): 440 MS
QTC FREDERICIA: 415 MS
R AXIS: -3 DEGREES
T AXIS: 62 DEGREES
T OFFSET: 407 MS
VENTRICULAR RATE: 85 BPM

## 2025-02-20 PROCEDURE — 93005 ELECTROCARDIOGRAM TRACING: CPT

## 2025-03-13 ENCOUNTER — HOSPITAL ENCOUNTER (OUTPATIENT)
Dept: RADIOLOGY | Facility: HOSPITAL | Age: 75
Discharge: HOME | End: 2025-03-13
Payer: MEDICARE

## 2025-03-13 ENCOUNTER — HOSPITAL ENCOUNTER (OUTPATIENT)
Dept: CARDIOLOGY | Facility: HOSPITAL | Age: 75
Discharge: HOME | End: 2025-03-13
Payer: MEDICARE

## 2025-03-13 DIAGNOSIS — E11.9 DIABETES MELLITUS TYPE II, NON INSULIN DEPENDENT (MULTI): ICD-10-CM

## 2025-03-13 DIAGNOSIS — I25.10 CORONARY ARTERIOSCLEROSIS: ICD-10-CM

## 2025-03-13 PROCEDURE — 93018 CV STRESS TEST I&R ONLY: CPT | Performed by: INTERNAL MEDICINE

## 2025-03-13 PROCEDURE — 93016 CV STRESS TEST SUPVJ ONLY: CPT | Performed by: INTERNAL MEDICINE

## 2025-03-13 PROCEDURE — 93017 CV STRESS TEST TRACING ONLY: CPT

## 2025-03-13 PROCEDURE — 3430000001 HC RX 343 DIAGNOSTIC RADIOPHARMACEUTICALS: Performed by: INTERNAL MEDICINE

## 2025-03-13 PROCEDURE — 78452 HT MUSCLE IMAGE SPECT MULT: CPT

## 2025-03-13 PROCEDURE — A9502 TC99M TETROFOSMIN: HCPCS | Performed by: INTERNAL MEDICINE

## 2025-03-13 RX ADMIN — TETROFOSMIN 11.2 MILLICURIE: 0.23 INJECTION, POWDER, LYOPHILIZED, FOR SOLUTION INTRAVENOUS at 09:19

## 2025-03-13 RX ADMIN — TETROFOSMIN 36 MILLICURIE: 0.23 INJECTION, POWDER, LYOPHILIZED, FOR SOLUTION INTRAVENOUS at 10:52

## 2025-03-15 DIAGNOSIS — G63 POLYNEUROPATHY ASSOCIATED WITH UNDERLYING DISEASE (MULTI): ICD-10-CM

## 2025-03-17 RX ORDER — GABAPENTIN 300 MG/1
CAPSULE ORAL
Qty: 180 CAPSULE | Refills: 1 | Status: SHIPPED | OUTPATIENT
Start: 2025-03-17

## 2025-03-24 ENCOUNTER — TELEPHONE (OUTPATIENT)
Dept: ENDOCRINOLOGY | Facility: CLINIC | Age: 75
End: 2025-03-24
Payer: MEDICARE

## 2025-03-25 ENCOUNTER — TELEPHONE (OUTPATIENT)
Dept: CARDIOLOGY | Facility: HOSPITAL | Age: 75
End: 2025-03-25
Payer: MEDICARE

## 2025-03-25 NOTE — TELEPHONE ENCOUNTER
Per Dr. Dutta, I provided the patient with the following results:  - Normal exercise nuclear stress test  - Normal left ventricular function.   Patient verbalized understanding.

## 2025-04-03 ENCOUNTER — PATIENT MESSAGE (OUTPATIENT)
Dept: ENDOCRINOLOGY | Facility: CLINIC | Age: 75
End: 2025-04-03
Payer: MEDICARE

## 2025-05-08 ENCOUNTER — TELEPHONE (OUTPATIENT)
Dept: ENDOCRINOLOGY | Facility: CLINIC | Age: 75
End: 2025-05-08
Payer: MEDICARE

## 2025-05-08 NOTE — TELEPHONE ENCOUNTER
Called patient to notify her that her Ozempic was in. Patient said that she will stop in tomorrow 5/8 to pick it up.

## 2025-06-02 ENCOUNTER — APPOINTMENT (OUTPATIENT)
Dept: PODIATRY | Facility: HOSPITAL | Age: 75
End: 2025-06-02
Payer: MEDICARE

## 2025-06-10 ENCOUNTER — LAB (OUTPATIENT)
Dept: LAB | Facility: HOSPITAL | Age: 75
End: 2025-06-10
Payer: MEDICARE

## 2025-06-10 DIAGNOSIS — E83.52 HYPERCALCEMIA: ICD-10-CM

## 2025-06-10 LAB — PROT SERPL-MCNC: 7.3 G/DL (ref 6.4–8.2)

## 2025-06-10 PROCEDURE — 36415 COLL VENOUS BLD VENIPUNCTURE: CPT

## 2025-06-10 PROCEDURE — 84165 PROTEIN E-PHORESIS SERUM: CPT

## 2025-06-10 PROCEDURE — 84155 ASSAY OF PROTEIN SERUM: CPT

## 2025-06-10 PROCEDURE — 83521 IG LIGHT CHAINS FREE EACH: CPT

## 2025-06-11 LAB
KAPPA LC SERPL-MCNC: 4.31 MG/DL (ref 0.33–1.94)
KAPPA LC/LAMBDA SER: 1.21 {RATIO} (ref 0.26–1.65)
LAMBDA LC SERPL-MCNC: 3.57 MG/DL (ref 0.57–2.63)

## 2025-06-12 LAB
ALBUMIN: 3.7 G/DL (ref 3.4–5)
ALPHA 1 GLOBULIN: 0.5 G/DL (ref 0.2–0.6)
ALPHA 2 GLOBULIN: 1 G/DL (ref 0.4–1.1)
BETA GLOBULIN: 1 G/DL (ref 0.5–1.2)
GAMMA GLOBULIN: 1.2 G/DL (ref 0.5–1.4)
PATH REVIEW-SERUM PROTEIN ELECTROPHORESIS: NORMAL
PROTEIN ELECTROPHORESIS COMMENT: NORMAL

## 2025-06-12 NOTE — PROGRESS NOTES
"FUV for diabetes. LV with me 1/2025.    Subjective   Miley Botello is a 74 y.o. female with a hx of type 2 diabetes, HLD, breast cancer, who presents for management of diabetes.    Dx: 2017  HbA1c: 6.7% (06/2025), 6.2% (1/6/2025), 7.5% (8/22/2024), 6.8% (4/11/2024), 6.7% (11/30/2023), 6.8% (03/2022), 6.5% (09/2021)  Current regimen: metformin 1g BID, Ozempic 2 mg/week  Past medications: none  Complications: peripheral neuropathy    SMBG: does not check    Hypoglycemia: no  Hypoglycemia awareness: no    Diet: twice a day, low-carb  Eats breakfast and dinner    Exercise: working out 4-5 days per week the gym ([employer]; 5 day per week, water aerobics for 1 hour; twice a week treadmill 3 times per week, weight circuit)    Today:  -doing well    ROS  General: no fever or chills  CV: no chest pain   Respiratory: no shortness of breath  MSK: no lower extremity edema  Neuro: no headache or dizziness  See HPI for Endocrine ROS    Objective    Physical Exam  Blood pressure 110/70, pulse 68, temperature 35.2 °C (95.4 °F), temperature source Temporal, height 1.562 m (5' 1.5\"), weight 64.9 kg (143 lb).  General: not in acute distress  HEENT: KENYATTA, EOMI  Thyroid: no goiter  Neuro: alert and oriented x 3    Current Outpatient Medications:     semaglutide (Ozempic) 1 mg/dose (4 mg/3 mL) pen injector, Inject 1 mg under the skin 1 (one) time per week. (Patient taking differently: Inject 2 mg under the skin 1 (one) time per week.), Disp: , Rfl:     atorvastatin (Lipitor) 40 mg tablet, Take 1 tablet (40 mg) by mouth once daily., Disp: 90 tablet, Rfl: 3    biotin 1 mg tablet, Take 1 tablet (1 mg) by mouth once daily., Disp: , Rfl:     cholecalciferol (Vitamin D-3) 125 MCG (5000 UT) capsule, Take 1 capsule (125 mcg) by mouth once daily., Disp: , Rfl:     cyanocobalamin (Vitamin B-12) 1,000 mcg tablet, Take 1 tablet (1,000 mcg) by mouth twice a day. (Patient taking differently: Take 1 tablet (1,000 mcg) by mouth once daily.), Disp: , " Rfl:     gabapentin (Neurontin) 300 mg capsule, TAKE 2 CAPSULES BY MOUTH AT BEDTIME, Disp: 180 capsule, Rfl: 1    metFORMIN (Glucophage) 1,000 mg tablet, TAKE 1 TABLET BY MOUTH TWICE A DAY, Disp: 180 tablet, Rfl: 1     Latest Reference Range & Units 03/22/22 13:00 03/29/22 09:54 03/30/22 07:06 03/31/22 12:03   Calcium 8.6 - 10.6 mg/dL 11.0 (H) 11.1 (H) 9.0 9.1      Latest Reference Range & Units 04/21/22 15:16 05/06/22 13:30 10/13/22 11:05 08/21/24 11:10 01/06/25 11:35 06/10/25 10:57   Calcium 8.6 - 10.6 mg/dL 11.0 (H) 10.2 11.0 (H) 10.8 (H) 10.6    Parathyroid Hormone, Intact 18.5 - 88.0 pg/mL  34.8  25.5 25.6    Vitamin D, 25-Hydroxy, Total 30 - 100 ng/mL  49  58 66    Ig Kappa Free Light Chain 0.33 - 1.94 mg/dL      4.31 (H)   Ig Lambda Free Light Chain 0.57 - 2.63 mg/dL      3.57 (H)      Latest Reference Range & Units 06/10/25 11:00   TSH 0.40 - 4.50 mIU/L 1.51      Latest Reference Range & Units 05/06/22 13:30   PTH Related Peptide pmol/L <0.4      Latest Reference Range & Units 06/16/25 09:16   CALCIUM, 24 HOUR URINE 35 - 250 mg/24 h 101   CALCIUM/CREATININE RATIO 30 - 275 mg/g creat 100   CREATININE, 24 HOUR URINE 0.50 - 2.15 g/24 h 1.01   URINE VOLUME mL 1,900     Assessment/Plan   Miley Botello is a 74 y.o. female with a hx of type 2 diabetes, HLD, breast cancer, who presents for management of diabetes.    Type 2 diabetes mellitus  HbA1c: 6.7% (06/2025), 6.2% (1/6/2025), 7.5% (8/22/2024), 6.8% (4/11/2024), 6.7% (11/30/2023), 6.8% (03/2022), 6.5% (09/2021)  Current regimen: metformin 1g BID, Ozempic 2 mg/week  Eye exam: +mild NPDR (12/2024)  Urine microalbumin: 31.2 micrograms/mg (08/2024)  Podiatry:  +neuropathy (LV 11/2023)-due  Lipids: HDL 55, LDL 75,  (08/2024) on atorvastatin 10 mg QDAY    A1c: 6.7%.   Has lost another 10 lbs since last visit.  Working on maintaining low-carb diet.  No changes to regimen today.    Need to reapply for [employer] PAP for Ozempic every  December.    PLAN:  -continue Ozempic 2 mg/week  -continue metformin 1 g BID  -continue gabapentin 600 mg at bedtime for neuropathy  -refer to podiatry (need to reschedule)    2. Hypercalcemia  Intermittently elevated calcium at least since 03/2017.  Highest calcium 11.1.    Hx of kidney stones: once, approx 2020  D3 supplement: 5000 units per day  Calcium: no supplements; eats greek yogurt and almond milk    DXA (08/2021):  Minoff (compared to 2018)  L1-L4: BMD 1.368  T-score 1.6  Z-score 3.0  change vs previous: -3.6%*  Left fem neck: BMD 0.926  T-score -0.8  Z-score 0.7  change vs previous -1.8%  Left total: BMD 1.025  T-score 0.1  Z-score 1.4  1/3 Radius: BMD 0.793  T-score NA    DXA (01/2024):  Minoff  L1-L4: BMD 1.476  T-score 2.5  Z-score 4.2 change vs previous: +7.9%*  Left fem neck: BMD 0.979  T-score -0.4  Z-score 1.4  change vs previous:  Left total hip: BMD 1.043  T-score 0.3  Z-score 1.9  change vs previous: -1.8%  1/3 Radius: BMD 0.774  T-score -1.3  Z-score 0.8  change vs previous: -2.4%    Calcium intermittently elevated (highest 11.1) at least since 03/2017 (labs in care everywhere). Low-normal PTH.   PTHrP, TSH normal.  Kappa and Lambda free light chains elevated but ratio is normal.  SPEP, vitamin A, and D1,25OH normal.  24H urine calcium 101 mg/day (normal).    PTH has been in the 20s when hypercalcemic. Less likely PHPT.  However, evaluation of non-PTH mediated causes of hypercalcemia have been negative. Kappa and lambda light chains are elevated but the ratio is normal.  The patient prefers to see a specialist to make sure that she does not have multiple myeloma. Will refer.    PLAN:  -refer to hematology/oncology  -continue vitamin D supplements    Follow-up in 6 months with Dr. Dee ([facility])  Uses [employer] [facility].

## 2025-06-14 LAB
1,25(OH)2D SERPL-MCNC: 39 PG/ML (ref 18–72)
1,25(OH)2D2 SERPL-MCNC: <8 PG/ML
1,25(OH)2D3 SERPL-MCNC: 39 PG/ML
TSH SERPL-ACNC: 1.51 MIU/L (ref 0.4–4.5)
VIT A SERPL-MCNC: 46 MCG/DL (ref 38–98)

## 2025-06-18 ENCOUNTER — APPOINTMENT (OUTPATIENT)
Dept: ENDOCRINOLOGY | Facility: CLINIC | Age: 75
End: 2025-06-18
Payer: MEDICARE

## 2025-06-18 VITALS
WEIGHT: 143 LBS | BODY MASS INDEX: 26.31 KG/M2 | DIASTOLIC BLOOD PRESSURE: 70 MMHG | TEMPERATURE: 95.4 F | HEIGHT: 62 IN | SYSTOLIC BLOOD PRESSURE: 110 MMHG | HEART RATE: 68 BPM

## 2025-06-18 DIAGNOSIS — E11.69 DM TYPE 2 WITH DIABETIC MIXED HYPERLIPIDEMIA (MULTI): ICD-10-CM

## 2025-06-18 DIAGNOSIS — E83.52 HYPERCALCEMIA: Primary | ICD-10-CM

## 2025-06-18 DIAGNOSIS — E11.40 TYPE 2 DIABETES MELLITUS WITH CHRONIC PAINFUL DIABETIC NEUROPATHY: ICD-10-CM

## 2025-06-18 DIAGNOSIS — E78.2 DM TYPE 2 WITH DIABETIC MIXED HYPERLIPIDEMIA (MULTI): ICD-10-CM

## 2025-06-18 LAB
CALCIUM 24H UR-MRATE: 101 MG/24 H (ref 35–250)
CALCIUM/CREAT 24H UR: 100 MG/G CREAT (ref 30–275)
CREAT 24H UR-MRATE: 1.01 G/24 H (ref 0.5–2.15)
POC HEMOGLOBIN A1C: 6.7 % (ref 4.2–6.5)
SPECIMEN VOL 24H UR: 1900 ML

## 2025-06-18 PROCEDURE — 99214 OFFICE O/P EST MOD 30 MIN: CPT | Performed by: STUDENT IN AN ORGANIZED HEALTH CARE EDUCATION/TRAINING PROGRAM

## 2025-06-18 PROCEDURE — 1036F TOBACCO NON-USER: CPT | Performed by: STUDENT IN AN ORGANIZED HEALTH CARE EDUCATION/TRAINING PROGRAM

## 2025-06-18 PROCEDURE — G2211 COMPLEX E/M VISIT ADD ON: HCPCS | Performed by: STUDENT IN AN ORGANIZED HEALTH CARE EDUCATION/TRAINING PROGRAM

## 2025-06-18 PROCEDURE — 1159F MED LIST DOCD IN RCRD: CPT | Performed by: STUDENT IN AN ORGANIZED HEALTH CARE EDUCATION/TRAINING PROGRAM

## 2025-06-18 PROCEDURE — 3008F BODY MASS INDEX DOCD: CPT | Performed by: STUDENT IN AN ORGANIZED HEALTH CARE EDUCATION/TRAINING PROGRAM

## 2025-06-18 PROCEDURE — 3078F DIAST BP <80 MM HG: CPT | Performed by: STUDENT IN AN ORGANIZED HEALTH CARE EDUCATION/TRAINING PROGRAM

## 2025-06-18 PROCEDURE — 3074F SYST BP LT 130 MM HG: CPT | Performed by: STUDENT IN AN ORGANIZED HEALTH CARE EDUCATION/TRAINING PROGRAM

## 2025-06-18 PROCEDURE — 3044F HG A1C LEVEL LT 7.0%: CPT | Performed by: STUDENT IN AN ORGANIZED HEALTH CARE EDUCATION/TRAINING PROGRAM

## 2025-06-18 PROCEDURE — 83036 HEMOGLOBIN GLYCOSYLATED A1C: CPT | Performed by: STUDENT IN AN ORGANIZED HEALTH CARE EDUCATION/TRAINING PROGRAM

## 2025-06-18 RX ORDER — METFORMIN HYDROCHLORIDE 1000 MG/1
1000 TABLET ORAL 2 TIMES DAILY
Qty: 180 TABLET | Refills: 3 | Status: SHIPPED | OUTPATIENT
Start: 2025-06-18

## 2025-06-18 ASSESSMENT — PATIENT HEALTH QUESTIONNAIRE - PHQ9
1. LITTLE INTEREST OR PLEASURE IN DOING THINGS: NOT AT ALL
2. FEELING DOWN, DEPRESSED OR HOPELESS: NOT AT ALL
SUM OF ALL RESPONSES TO PHQ9 QUESTIONS 1 AND 2: 0

## 2025-06-18 ASSESSMENT — ENCOUNTER SYMPTOMS
LOSS OF SENSATION IN FEET: 1
OCCASIONAL FEELINGS OF UNSTEADINESS: 0
DEPRESSION: 0

## 2025-06-19 DIAGNOSIS — G63 POLYNEUROPATHY ASSOCIATED WITH UNDERLYING DISEASE: ICD-10-CM

## 2025-06-19 RX ORDER — GABAPENTIN 300 MG/1
600 CAPSULE ORAL NIGHTLY
Qty: 180 CAPSULE | Refills: 2 | Status: SHIPPED | OUTPATIENT
Start: 2025-06-19

## 2025-06-20 LAB
IMMUNOFIXATION COMMENT: NORMAL
PATH REVIEW - SERUM IMMUNOFIXATION: NORMAL

## 2025-06-27 ENCOUNTER — HOSPITAL ENCOUNTER (OUTPATIENT)
Dept: RADIOLOGY | Facility: CLINIC | Age: 75
Discharge: HOME | End: 2025-06-27
Payer: MEDICARE

## 2025-06-27 VITALS — HEIGHT: 61 IN | BODY MASS INDEX: 27.01 KG/M2 | WEIGHT: 143.08 LBS

## 2025-06-27 DIAGNOSIS — Z12.31 ENCOUNTER FOR SCREENING MAMMOGRAM FOR MALIGNANT NEOPLASM OF BREAST: ICD-10-CM

## 2025-06-27 DIAGNOSIS — R92.8 OTHER ABNORMAL AND INCONCLUSIVE FINDINGS ON DIAGNOSTIC IMAGING OF BREAST: ICD-10-CM

## 2025-06-27 PROCEDURE — 77067 SCR MAMMO BI INCL CAD: CPT

## 2025-07-22 ENCOUNTER — OFFICE VISIT (OUTPATIENT)
Dept: HEMATOLOGY/ONCOLOGY | Facility: HOSPITAL | Age: 75
End: 2025-07-22
Payer: MEDICARE

## 2025-07-22 ENCOUNTER — LAB (OUTPATIENT)
Dept: LAB | Facility: HOSPITAL | Age: 75
End: 2025-07-22
Payer: MEDICARE

## 2025-07-22 ENCOUNTER — SOCIAL WORK (OUTPATIENT)
Dept: HEMATOLOGY/ONCOLOGY | Facility: HOSPITAL | Age: 75
End: 2025-07-22
Payer: MEDICARE

## 2025-07-22 VITALS
TEMPERATURE: 97.3 F | SYSTOLIC BLOOD PRESSURE: 164 MMHG | HEART RATE: 75 BPM | OXYGEN SATURATION: 100 % | WEIGHT: 142.2 LBS | DIASTOLIC BLOOD PRESSURE: 74 MMHG | RESPIRATION RATE: 17 BRPM | HEIGHT: 61 IN | BODY MASS INDEX: 26.85 KG/M2

## 2025-07-22 DIAGNOSIS — R76.8 ELEVATED SERUM IMMUNOGLOBULIN FREE LIGHT CHAINS: ICD-10-CM

## 2025-07-22 DIAGNOSIS — R76.8 ELEVATED SERUM IMMUNOGLOBULIN FREE LIGHT CHAINS: Primary | ICD-10-CM

## 2025-07-22 DIAGNOSIS — E83.52 HYPERCALCEMIA: ICD-10-CM

## 2025-07-22 LAB
ALBUMIN SERPL BCP-MCNC: 4.5 G/DL (ref 3.4–5)
ALP SERPL-CCNC: 341 U/L (ref 33–136)
ALT SERPL W P-5'-P-CCNC: 24 U/L (ref 7–45)
ANION GAP SERPL CALC-SCNC: 15 MMOL/L (ref 10–20)
AST SERPL W P-5'-P-CCNC: 21 U/L (ref 9–39)
B2 MICROGLOB SERPL-MCNC: 3.1 MG/L (ref 0.7–2.2)
BASOPHILS # BLD AUTO: 0.05 X10*3/UL (ref 0–0.1)
BASOPHILS NFR BLD AUTO: 0.5 %
BILIRUB DIRECT SERPL-MCNC: 0.1 MG/DL (ref 0–0.3)
BILIRUB SERPL-MCNC: 0.6 MG/DL (ref 0–1.2)
BUN SERPL-MCNC: 18 MG/DL (ref 6–23)
CALCIUM SERPL-MCNC: 10.9 MG/DL (ref 8.6–10.3)
CHLORIDE SERPL-SCNC: 101 MMOL/L (ref 98–107)
CHOLEST SERPL-MCNC: 137 MG/DL (ref 0–199)
CHOLESTEROL/HDL RATIO: 2.7
CO2 SERPL-SCNC: 27 MMOL/L (ref 21–32)
CREAT SERPL-MCNC: 0.8 MG/DL (ref 0.5–1.05)
EGFRCR SERPLBLD CKD-EPI 2021: 78 ML/MIN/1.73M*2
EOSINOPHIL # BLD AUTO: 0.26 X10*3/UL (ref 0–0.4)
EOSINOPHIL NFR BLD AUTO: 2.8 %
ERYTHROCYTE [DISTWIDTH] IN BLOOD BY AUTOMATED COUNT: 12.7 % (ref 11.5–14.5)
GLUCOSE SERPL-MCNC: 160 MG/DL (ref 74–99)
HCT VFR BLD AUTO: 38.1 % (ref 36–46)
HDLC SERPL-MCNC: 50.1 MG/DL
HGB BLD-MCNC: 12.4 G/DL (ref 12–16)
IGA SERPL-MCNC: 340 MG/DL (ref 70–400)
IGG SERPL-MCNC: 1330 MG/DL (ref 700–1600)
IGM SERPL-MCNC: 110 MG/DL (ref 40–230)
IMM GRANULOCYTES # BLD AUTO: 0.03 X10*3/UL (ref 0–0.5)
IMM GRANULOCYTES NFR BLD AUTO: 0.3 % (ref 0–0.9)
LDLC SERPL CALC-MCNC: 52 MG/DL
LYMPHOCYTES # BLD AUTO: 2.14 X10*3/UL (ref 0.8–3)
LYMPHOCYTES NFR BLD AUTO: 22.9 %
MCH RBC QN AUTO: 28.9 PG (ref 26–34)
MCHC RBC AUTO-ENTMCNC: 32.5 G/DL (ref 32–36)
MCV RBC AUTO: 89 FL (ref 80–100)
MONOCYTES # BLD AUTO: 0.51 X10*3/UL (ref 0.05–0.8)
MONOCYTES NFR BLD AUTO: 5.5 %
NEUTROPHILS # BLD AUTO: 6.35 X10*3/UL (ref 1.6–5.5)
NEUTROPHILS NFR BLD AUTO: 68 %
NON HDL CHOLESTEROL: 87 MG/DL (ref 0–149)
NRBC BLD-RTO: 0 /100 WBCS (ref 0–0)
PLATELET # BLD AUTO: 318 X10*3/UL (ref 150–450)
POTASSIUM SERPL-SCNC: 3.8 MMOL/L (ref 3.5–5.3)
PROT SERPL-MCNC: 7.9 G/DL (ref 6.4–8.2)
PROT SERPL-MCNC: 8.4 G/DL (ref 6.4–8.2)
PROT UR-ACNC: 25 MG/DL (ref 5–25)
RBC # BLD AUTO: 4.29 X10*6/UL (ref 4–5.2)
SODIUM SERPL-SCNC: 139 MMOL/L (ref 136–145)
TRIGL SERPL-MCNC: 175 MG/DL (ref 0–149)
TSH SERPL-ACNC: 2.35 MIU/L (ref 0.44–3.98)
VLDL: 35 MG/DL (ref 0–40)
WBC # BLD AUTO: 9.3 X10*3/UL (ref 4.4–11.3)

## 2025-07-22 PROCEDURE — 82232 ASSAY OF BETA-2 PROTEIN: CPT

## 2025-07-22 PROCEDURE — 3044F HG A1C LEVEL LT 7.0%: CPT

## 2025-07-22 PROCEDURE — 3077F SYST BP >= 140 MM HG: CPT

## 2025-07-22 PROCEDURE — 80061 LIPID PANEL: CPT | Performed by: INTERNAL MEDICINE

## 2025-07-22 PROCEDURE — 83521 IG LIGHT CHAINS FREE EACH: CPT

## 2025-07-22 PROCEDURE — 36415 COLL VENOUS BLD VENIPUNCTURE: CPT

## 2025-07-22 PROCEDURE — 85025 COMPLETE CBC W/AUTO DIFF WBC: CPT

## 2025-07-22 PROCEDURE — 84443 ASSAY THYROID STIM HORMONE: CPT

## 2025-07-22 PROCEDURE — 84156 ASSAY OF PROTEIN URINE: CPT

## 2025-07-22 PROCEDURE — 99215 OFFICE O/P EST HI 40 MIN: CPT

## 2025-07-22 PROCEDURE — 82652 VIT D 1 25-DIHYDROXY: CPT

## 2025-07-22 PROCEDURE — 84590 ASSAY OF VITAMIN A: CPT

## 2025-07-22 PROCEDURE — 3078F DIAST BP <80 MM HG: CPT

## 2025-07-22 PROCEDURE — 82784 ASSAY IGA/IGD/IGG/IGM EACH: CPT

## 2025-07-22 PROCEDURE — 84155 ASSAY OF PROTEIN SERUM: CPT

## 2025-07-22 PROCEDURE — 3008F BODY MASS INDEX DOCD: CPT

## 2025-07-22 PROCEDURE — 80053 COMPREHEN METABOLIC PANEL: CPT

## 2025-07-22 PROCEDURE — 82248 BILIRUBIN DIRECT: CPT | Performed by: INTERNAL MEDICINE

## 2025-07-22 PROCEDURE — 1126F AMNT PAIN NOTED NONE PRSNT: CPT

## 2025-07-22 ASSESSMENT — PAIN SCALES - GENERAL: PAINLEVEL_OUTOF10: 0-NO PAIN

## 2025-07-22 NOTE — PROGRESS NOTES
In response to [facility] NPV scheduled for 7/22/25, SW mailed patient flyer outlining scope of available social work support services, as well as direct contact information; this encourages patient to reach out to SW with any future psychosocial needs or concerns. SW to remain available for support as appropriate.

## 2025-07-22 NOTE — PROGRESS NOTES
"Patient ID: Miley Botello is a 74 y.o. female.    Initial MGUS Workup 6/10/25   - SPEP: No monoclonal proteins detected  - SFLC: K 3.79, L 3.05, R 1.24  - Immunoglobulins: Unremarkable  - UPEP: Pending    -S: N/A  -Li: K 3.79, L 3.05, R 1.24  -M: N/A  -C: 10.9  -R: SrCr 0.8  -A: Hgb 12.4  -B: N/A  SUBJECTIVE   Miley presents as a new patient to me as a new patient for evaluation of elevated serum light chains, accompanied by her friend, Ana. She requested this referral from her endocrinologist in the setting of unexplained hypercalcemia. She wants to make sure she does not have multiple myeloma. Miley reports feeling well overall. She notes some numbness/tingling in her toes she associates with diabetic neuropathy. She denies fevers, chills, n/v/d/c, night sweats, new bony pain, chest pain, fatigue, unintentional weight loss.      Review of Systems   Constitutional: Negative.    HENT:  Negative.     Eyes: Negative.    Respiratory: Negative.     Cardiovascular: Negative.    Gastrointestinal: Negative.    Endocrine: Negative.    Genitourinary: Negative.     Musculoskeletal: Negative.    Skin: Negative.    Neurological:  Positive for numbness.   Hematological: Negative.    Psychiatric/Behavioral: Negative.           OBJECTIVE      BSA: 1.66 meters squared  /74   Pulse 75   Temp 36.3 °C (97.3 °F)   Resp 17   Ht (S) (!) 1.542 m (5' 0.71\")   Wt 64.5 kg (142 lb 3.2 oz)   SpO2 100%   BMI 27.13 kg/m²     Physical Exam  Constitutional:       Appearance: Normal appearance.     Eyes:      Conjunctiva/sclera: Conjunctivae normal.      Pupils: Pupils are equal, round, and reactive to light.       Cardiovascular:      Rate and Rhythm: Normal rate and regular rhythm.      Pulses: Normal pulses.      Heart sounds: Normal heart sounds.   Pulmonary:      Effort: Pulmonary effort is normal.      Breath sounds: Normal breath sounds.   Abdominal:      General: Abdomen is flat.      Palpations: Abdomen is soft. "     Musculoskeletal:         General: Normal range of motion.     Skin:     General: Skin is warm and dry.     Neurological:      General: No focal deficit present.      Mental Status: She is alert and oriented to person, place, and time.     Psychiatric:         Mood and Affect: Mood normal.         Behavior: Behavior normal.         Performance Status:  Karnofsky Score: 100 - Fully active, able to carry on all pre-disease performed without restriction    MONITORING: MYELOMA LAB MARKERS  MARKERS RECENT LAB VALUES   Free Kappa Light Chains Lab Results   Component Value Date    KAPPA 3.79 (H) 07/22/2025    KAPPA 4.31 (H) 06/10/2025      Free Lambda Light Chains Lab Results   Component Value Date    LAMBDA 3.05 (H) 07/22/2025    LAMBDA 3.57 (H) 06/10/2025      Free Light Chain Ratio Lab Results   Component Value Date    KAPLS 1.24 07/22/2025    KAPLS 1.21 06/10/2025       Immunofixation Lab Results   Component Value Date    IEPIN No monoclonal protein detected by immunofixation. 06/10/2025      IgG Lab Results   Component Value Date    IGG 1,330 07/22/2025      IgA Lab Results   Component Value Date     07/22/2025      IgM Lab Results   Component Value Date     07/22/2025        Other Labs  Lab Results   Component Value Date    WBC 9.3 07/22/2025    NRBC 0.0 07/22/2025    RBC 4.29 07/22/2025    HGB 12.4 07/22/2025    HCT 38.1 07/22/2025    MCV 89 07/22/2025    MCH 28.9 07/22/2025    MCHC 32.5 07/22/2025    RDW 12.7 07/22/2025     07/22/2025    IGPCT 0.3 07/22/2025    LYMPHOPCT 22.9 07/22/2025    MONOPCT 5.5 07/22/2025    EOSPCT 2.8 07/22/2025    BASOPCT 0.5 07/22/2025    NEUTROABS 6.35 (H) 07/22/2025    MONOSABS 0.51 07/22/2025    EOSABS 0.26 07/22/2025       Lab Results   Component Value Date    GLUCOSE 160 (H) 07/22/2025     07/22/2025    K 3.8 07/22/2025     07/22/2025    CO2 27 07/22/2025    ANIONGAP 15 07/22/2025    BUN 18 07/22/2025    CREATININE 0.80 07/22/2025    EGFR 78  "07/22/2025    CALCIUM 10.9 (H) 07/22/2025    ALBUMIN 4.5 07/22/2025    ALKPHOS 341 (H) 07/22/2025    PROT 8.4 (H) 07/22/2025    PROT 7.9 07/22/2025    AST 21 07/22/2025    BILITOT 0.6 07/22/2025    ALT 24 07/22/2025       No results found for: \"LDH\"    ASSESSMENT & PLAN   Elevated Serum Light Chains   - labs today: CBC, CMP, SPEP, UPEP, FLC, B2 microglobin, immunoglobulins   - elevated light chains, ratio normal  - no monoclonal protein on SPEP  - No SLiM CRAB criteria  - asymptomatic    Hypercalcemia  - intermittent  - alk phos elevated 341  - PTH WNL    DM II  - has associated neuropathy    Hx Breast Cancer  -2017  - treated w/ lumpectomy and radiation    RTC: 2 weeks to discuss labs     MELO Kumar-CNP      "

## 2025-07-23 LAB
KAPPA LC SERPL-MCNC: 3.79 MG/DL (ref 0.33–1.94)
KAPPA LC/LAMBDA SER: 1.24 {RATIO} (ref 0.26–1.65)
LAMBDA LC SERPL-MCNC: 3.05 MG/DL (ref 0.57–2.63)

## 2025-07-23 ASSESSMENT — ENCOUNTER SYMPTOMS
PSYCHIATRIC NEGATIVE: 1
GASTROINTESTINAL NEGATIVE: 1
HEMATOLOGIC/LYMPHATIC NEGATIVE: 1
CONSTITUTIONAL NEGATIVE: 1
EYES NEGATIVE: 1
RESPIRATORY NEGATIVE: 1
ENDOCRINE NEGATIVE: 1
CARDIOVASCULAR NEGATIVE: 1
NUMBNESS: 1
MUSCULOSKELETAL NEGATIVE: 1

## 2025-07-24 LAB — 1,25(OH)2D SERPL-MCNC: 41.5 PG/ML (ref 19.9–79.3)

## 2025-07-26 LAB
ANNOTATION COMMENT IMP: NORMAL
RETINYL PALMITATE SERPL-MCNC: <0.02 MG/L (ref 0–0.1)
VIT A SERPL-MCNC: 0.66 MG/L (ref 0.3–1.2)

## 2025-07-28 LAB
ALBUMIN MFR UR ELPH: 59.2 %
ALBUMIN: 4.2 G/DL (ref 3.4–5)
ALPHA 1 GLOBULIN: 0.4 G/DL (ref 0.2–0.6)
ALPHA 2 GLOBULIN: 0.9 G/DL (ref 0.4–1.1)
ALPHA1 GLOB MFR UR ELPH: 5.4 %
ALPHA2 GLOB MFR UR ELPH: 11.7 %
B-GLOBULIN MFR UR ELPH: 13.3 %
BETA GLOBULIN: 1 G/DL (ref 0.5–1.2)
GAMMA GLOB MFR UR ELPH: 10.4 %
GAMMA GLOBULIN: 1.3 G/DL (ref 0.5–1.4)
IMMUNOFIXATION COMMENT: NORMAL
IMMUNOFIXATION COMMENT: NORMAL
PATH REVIEW - SERUM IMMUNOFIXATION: NORMAL
PATH REVIEW - URINE IMMUNOFIXATION: NORMAL
PATH REVIEW-SERUM PROTEIN ELECTROPHORESIS: NORMAL
PATH REVIEW-URINE PROTEIN ELECTROPHORESIS: NORMAL
PROTEIN ELECTROPHORESIS COMMENT: NORMAL
URINE ELECTROPHORESIS COMMENT: NORMAL

## 2025-08-07 ENCOUNTER — TELEMEDICINE (OUTPATIENT)
Dept: HEMATOLOGY/ONCOLOGY | Facility: HOSPITAL | Age: 75
End: 2025-08-07
Payer: MEDICARE

## 2025-08-07 DIAGNOSIS — R76.8 ELEVATED SERUM IMMUNOGLOBULIN FREE LIGHT CHAINS: Primary | ICD-10-CM

## 2025-08-07 PROCEDURE — 3044F HG A1C LEVEL LT 7.0%: CPT

## 2025-08-07 PROCEDURE — 99215 OFFICE O/P EST HI 40 MIN: CPT

## 2025-08-07 ASSESSMENT — ENCOUNTER SYMPTOMS
CONSTITUTIONAL NEGATIVE: 1
GASTROINTESTINAL NEGATIVE: 1
RESPIRATORY NEGATIVE: 1
ENDOCRINE NEGATIVE: 1
NUMBNESS: 1
EYES NEGATIVE: 1
PSYCHIATRIC NEGATIVE: 1
CARDIOVASCULAR NEGATIVE: 1
HEMATOLOGIC/LYMPHATIC NEGATIVE: 1
MUSCULOSKELETAL NEGATIVE: 1

## 2025-08-07 NOTE — PROGRESS NOTES
Patient ID: Miley Botello is a 74 y.o. female.    Initial MGUS Workup 6/10/25   - SPEP: No monoclonal proteins detected  - SFLC: K 3.79, L 3.05, R 1.24  - Immunoglobulins: Unremarkable  - UPEP: Pending    -S: N/A  -Li: K 3.79, L 3.05, R 1.24  -M: N/A  -C: 10.9  -R: SrCr 0.8  -A: Hgb 12.4  -B: N/A  SUBJECTIVE   Miley presents as a new patient to me as a new patient for evaluation of elevated serum light chains, accompanied by her friend, Ana. She requested this referral from her endocrinologist in the setting of unexplained hypercalcemia. She wants to make sure she does not have multiple myeloma. Miley reports feeling well overall. She notes some numbness/tingling in her toes she associates with diabetic neuropathy. She denies fevers, chills, n/v/d/c, night sweats, new bony pain, chest pain, fatigue, unintentional weight loss.    8/7/25: Miley presents virtually to discuss her lab results. All questions answered at this time.       Review of Systems   Constitutional: Negative.    HENT:  Negative.     Eyes: Negative.    Respiratory: Negative.     Cardiovascular: Negative.    Gastrointestinal: Negative.    Endocrine: Negative.    Genitourinary: Negative.     Musculoskeletal: Negative.    Skin: Negative.    Neurological:  Positive for numbness.   Hematological: Negative.    Psychiatric/Behavioral: Negative.           OBJECTIVE      BSA: There is no height or weight on file to calculate BSA.  There were no vitals taken for this visit.    Physical Exam    Performance Status:  Karnofsky Score: 100 - Fully active, able to carry on all pre-disease performed without restriction    MONITORING: MYELOMA LAB MARKERS  MARKERS RECENT LAB VALUES   Free Kappa Light Chains Lab Results   Component Value Date    KAPPA 3.79 (H) 07/22/2025    KAPPA 4.31 (H) 06/10/2025      Free Lambda Light Chains Lab Results   Component Value Date    LAMBDA 3.05 (H) 07/22/2025    LAMBDA 3.57 (H) 06/10/2025      Free Light Chain Ratio Lab Results  "  Component Value Date    KAPLS 1.24 07/22/2025    KAPLS 1.21 06/10/2025       Immunofixation Lab Results   Component Value Date    IEPIN No monoclonal protein detected by immunofixation. 07/22/2025    IEPIN No monoclonal protein detected by immunofixation. 07/22/2025    IEPIN No monoclonal protein detected by immunofixation. 06/10/2025      IgG Lab Results   Component Value Date    IGG 1,330 07/22/2025      IgA Lab Results   Component Value Date     07/22/2025      IgM Lab Results   Component Value Date     07/22/2025        Other Labs  Lab Results   Component Value Date    WBC 9.3 07/22/2025    NRBC 0.0 07/22/2025    RBC 4.29 07/22/2025    HGB 12.4 07/22/2025    HCT 38.1 07/22/2025    MCV 89 07/22/2025    MCH 28.9 07/22/2025    MCHC 32.5 07/22/2025    RDW 12.7 07/22/2025     07/22/2025    IGPCT 0.3 07/22/2025    LYMPHOPCT 22.9 07/22/2025    MONOPCT 5.5 07/22/2025    EOSPCT 2.8 07/22/2025    BASOPCT 0.5 07/22/2025    NEUTROABS 6.35 (H) 07/22/2025    MONOSABS 0.51 07/22/2025    EOSABS 0.26 07/22/2025       Lab Results   Component Value Date    GLUCOSE 160 (H) 07/22/2025     07/22/2025    K 3.8 07/22/2025     07/22/2025    CO2 27 07/22/2025    ANIONGAP 15 07/22/2025    BUN 18 07/22/2025    CREATININE 0.80 07/22/2025    EGFR 78 07/22/2025    CALCIUM 10.9 (H) 07/22/2025    ALBUMIN 4.5 07/22/2025    ALKPHOS 341 (H) 07/22/2025    PROT 8.4 (H) 07/22/2025    PROT 7.9 07/22/2025    AST 21 07/22/2025    BILITOT 0.6 07/22/2025    ALT 24 07/22/2025       No results found for: \"LDH\"    ASSESSMENT & PLAN   Elevated Serum Light Chains   - elevated light chains, ratio normal  - no monoclonal protein on SPEP and UPEP  - No SLiM CRAB criteria  - asymptomatic    Hypercalcemia  - intermittent  - alk phos elevated 341  - PTH WNL    DM II  - has associated neuropathy    Hx Breast Cancer  -2017  - treated w/ lumpectomy and radiation    RTC: PRN     MELO Kumar-CNP      "

## 2025-08-08 ENCOUNTER — TELEPHONE (OUTPATIENT)
Dept: HEMATOLOGY/ONCOLOGY | Facility: CLINIC | Age: 75
End: 2025-08-08
Payer: MEDICARE

## 2025-08-08 DIAGNOSIS — C50.412 MALIGNANT NEOPLASM OF UPPER-OUTER QUADRANT OF BOTH BREASTS IN FEMALE, ESTROGEN RECEPTOR POSITIVE: ICD-10-CM

## 2025-08-08 DIAGNOSIS — R74.8 ELEVATED ALKALINE PHOSPHATASE LEVEL: Primary | ICD-10-CM

## 2025-08-08 DIAGNOSIS — E83.52 SERUM CALCIUM ELEVATED: ICD-10-CM

## 2025-08-08 DIAGNOSIS — Z85.3 PERSONAL HISTORY OF BREAST CANCER: ICD-10-CM

## 2025-08-08 DIAGNOSIS — C50.411 MALIGNANT NEOPLASM OF UPPER-OUTER QUADRANT OF BOTH BREASTS IN FEMALE, ESTROGEN RECEPTOR POSITIVE: ICD-10-CM

## 2025-08-08 DIAGNOSIS — Z17.0 MALIGNANT NEOPLASM OF UPPER-OUTER QUADRANT OF BOTH BREASTS IN FEMALE, ESTROGEN RECEPTOR POSITIVE: ICD-10-CM

## 2025-08-08 NOTE — TELEPHONE ENCOUNTER
Spoke with Hope about her recent labs and visit with hem/onc. Her Alk Phos was elevated >300. Due to this and her high calcium results, I will order a PET scan to r/o metastasis. Hope is in agreement. I will call her with the results of this testing. Guillermina

## 2025-08-12 ENCOUNTER — APPOINTMENT (OUTPATIENT)
Dept: HEMATOLOGY/ONCOLOGY | Facility: HOSPITAL | Age: 75
End: 2025-08-12
Payer: MEDICARE

## 2025-08-14 ENCOUNTER — HOSPITAL ENCOUNTER (OUTPATIENT)
Dept: RADIOLOGY | Facility: CLINIC | Age: 75
Discharge: HOME | End: 2025-08-14
Payer: MEDICARE

## 2025-08-14 ENCOUNTER — TELEPHONE (OUTPATIENT)
Dept: HEMATOLOGY/ONCOLOGY | Facility: CLINIC | Age: 75
End: 2025-08-14
Payer: MEDICARE

## 2025-08-14 DIAGNOSIS — Z85.3 PERSONAL HISTORY OF BREAST CANCER: ICD-10-CM

## 2025-08-14 DIAGNOSIS — R74.8 ELEVATED ALKALINE PHOSPHATASE LEVEL: ICD-10-CM

## 2025-08-14 DIAGNOSIS — C50.412 MALIGNANT NEOPLASM OF UPPER-OUTER QUADRANT OF BOTH BREASTS IN FEMALE, ESTROGEN RECEPTOR POSITIVE: ICD-10-CM

## 2025-08-14 DIAGNOSIS — E83.52 SERUM CALCIUM ELEVATED: ICD-10-CM

## 2025-08-14 DIAGNOSIS — C50.411 MALIGNANT NEOPLASM OF UPPER-OUTER QUADRANT OF BOTH BREASTS IN FEMALE, ESTROGEN RECEPTOR POSITIVE: ICD-10-CM

## 2025-08-14 DIAGNOSIS — Z17.0 MALIGNANT NEOPLASM OF UPPER-OUTER QUADRANT OF BOTH BREASTS IN FEMALE, ESTROGEN RECEPTOR POSITIVE: ICD-10-CM

## 2025-08-14 PROCEDURE — 3430000001 HC RX 343 DIAGNOSTIC RADIOPHARMACEUTICALS: Performed by: NURSE PRACTITIONER

## 2025-08-14 PROCEDURE — A9552 F18 FDG: HCPCS | Performed by: NURSE PRACTITIONER

## 2025-08-14 PROCEDURE — 78816 PET IMAGE W/CT FULL BODY: CPT | Mod: PS

## 2025-08-14 RX ORDER — FLUDEOXYGLUCOSE F 18 200 MCI/ML
12.63 INJECTION, SOLUTION INTRAVENOUS
Status: COMPLETED | OUTPATIENT
Start: 2025-08-14 | End: 2025-08-14

## 2025-08-14 RX ADMIN — FLUDEOXYGLUCOSE F 18 12.63 MILLICURIE: 200 INJECTION, SOLUTION INTRAVENOUS at 09:52

## 2025-09-03 ENCOUNTER — TELEPHONE (OUTPATIENT)
Dept: ENDOCRINOLOGY | Facility: CLINIC | Age: 75
End: 2025-09-03
Payer: MEDICARE

## 2026-01-08 ENCOUNTER — APPOINTMENT (OUTPATIENT)
Dept: ENDOCRINOLOGY | Facility: CLINIC | Age: 76
End: 2026-01-08
Payer: MEDICARE

## 2026-04-07 ENCOUNTER — APPOINTMENT (OUTPATIENT)
Dept: ENDOCRINOLOGY | Facility: CLINIC | Age: 76
End: 2026-04-07
Payer: MEDICARE